# Patient Record
Sex: MALE | Race: WHITE | NOT HISPANIC OR LATINO
[De-identification: names, ages, dates, MRNs, and addresses within clinical notes are randomized per-mention and may not be internally consistent; named-entity substitution may affect disease eponyms.]

---

## 2018-12-28 ENCOUNTER — RECORD ABSTRACTING (OUTPATIENT)
Age: 83
End: 2018-12-28

## 2018-12-28 DIAGNOSIS — Z82.3 FAMILY HISTORY OF STROKE: ICD-10-CM

## 2019-01-11 ENCOUNTER — RX RENEWAL (OUTPATIENT)
Age: 84
End: 2019-01-11

## 2019-01-14 ENCOUNTER — RX RENEWAL (OUTPATIENT)
Age: 84
End: 2019-01-14

## 2019-01-25 ENCOUNTER — APPOINTMENT (OUTPATIENT)
Dept: UROLOGY | Facility: CLINIC | Age: 84
End: 2019-01-25
Payer: MEDICARE

## 2019-01-25 VITALS
SYSTOLIC BLOOD PRESSURE: 154 MMHG | HEIGHT: 71 IN | HEART RATE: 58 BPM | DIASTOLIC BLOOD PRESSURE: 62 MMHG | WEIGHT: 196 LBS | BODY MASS INDEX: 27.44 KG/M2

## 2019-01-25 LAB
BACTERIA: 0
BILIRUB UR QL STRIP: NORMAL
CASTS: 0
CLARITY UR: CLEAR
COLLECTION METHOD: NORMAL
CRYSTALS: 0
DATE COLLECTED: NORMAL
EPITHELIAL CELLS: NORMAL
GLUCOSE UR-MCNC: NORMAL
HCG UR QL: NORMAL EU/DL
HEMOCCULT SP1 STL QL: NEGATIVE
HGB UR QL STRIP.AUTO: NORMAL
KETONES UR-MCNC: NORMAL
LEUKOCYTE ESTERASE UR QL STRIP: NORMAL
MUCUS: 0
NITRITE UR QL STRIP: NORMAL
PH UR STRIP: 5
PROT UR STRIP-MCNC: NORMAL
QUALITY CONTROL: YES
RBC CASTS # UR COMP ASSIST: 0
SP GR UR STRIP: 1.01
WBC: NORMAL

## 2019-01-25 PROCEDURE — 52000 CYSTOURETHROSCOPY: CPT

## 2019-01-25 PROCEDURE — 81000 URINALYSIS NONAUTO W/SCOPE: CPT

## 2019-01-25 PROCEDURE — 99213 OFFICE O/P EST LOW 20 MIN: CPT | Mod: 25

## 2019-01-25 PROCEDURE — 82270 OCCULT BLOOD FECES: CPT

## 2019-01-25 RX ORDER — CIPROFLOXACIN HYDROCHLORIDE 500 MG/1
500 TABLET, FILM COATED ORAL
Qty: 3 | Refills: 3 | Status: DISCONTINUED | COMMUNITY
Start: 2019-01-14 | End: 2019-01-25

## 2019-01-25 RX ORDER — CIPROFLOXACIN HYDROCHLORIDE 500 MG/1
500 TABLET, FILM COATED ORAL
Qty: 3 | Refills: 3 | Status: DISCONTINUED | COMMUNITY
Start: 2019-01-11 | End: 2019-01-25

## 2019-01-25 RX ORDER — LABETALOL HYDROCHLORIDE 200 MG/1
200 TABLET, FILM COATED ORAL
Refills: 0 | Status: DISCONTINUED | COMMUNITY
End: 2019-01-25

## 2019-01-25 NOTE — CHIEF COMPLAINT
[FreeTextEntry1] : 1.  BLADDER CANCER:  Here for surveillance cystoscopy\par 2.  PROSTATE CANCER:  6.6 years S/P R*T; here for PSA blood testing and examination

## 2019-01-25 NOTE — PROCEDURE
[Cystoscopy] : cystoscopy [Patient] : the patient [Pt took necessary Preparations and Antibiotics for Procedure] : pt took necessary preparations and antibiotics for procedure [None] : none [Ciprofloxacin] : Ciprofloxacin [Supine] : supine [Betadine] : with betadine [Intraurethral 2% Lidocaine Gel ___ (cc)] : [unfilled] cc of 2% Lidocaine Gel was administered intraurethrally  [Flexible Cystoscope] : A flexible cystoscope was used to visualize the urethra and bladder. [Normal] : was normal [Length ___ cm] : was estimated to be [unfilled] cm in length [Trilobar Hypertrophy] : had an enlargement of the lateral and median prostatic lobes [Other ___] : [unfilled] [] : had clear efflux of urine bilaterally [No Bladder Tumor] : No bladder tumor visualized [3] : a grade 3 [Multiple] : diverticula were present in the bladder wall [Instrumented] : an instrumented [Cytology] : cytology [FISH] : FISH [No Complications] : There were no complications [Tolerated Well] : the patient tolerated the procedure well [Ciprofloxacin] : Ciprofloxacin [unfilled] [Post procedure instructions and information given] : post procedure instructions and information given and reviewed with patient. [de-identified] : The bladder appeared 2-3+ trabeculated with small cellules and diverticuli present, mostly on the floor. There is no evidence of suspicious mucosal thickening, suspicious erythematous patches, or any obvious neoplasm, foreign body or stone. [de-identified] : After the examination was completed the patient voided 184 cc the complex uroflow machine with an excellent stream.\par \par Maximum flow: 18 cc per second (+42% of the median deviation)\par \par Outflow: 8 cc per second (-1% below the median deviation)\par \par Voiding time: 21 seconds (-2% below the median deviation)\par \par The patient emptied his bladder completely.\par Cystoscopy today appears benign.Surveillance in 6 months as planned.

## 2019-01-25 NOTE — REVIEW OF SYSTEMS
[Loss Of Hearing] : hearing loss [Joint Pain] : joint pain [Negative] : Heme/Lymph [Chest Pain] : no chest pain [Palpitations] : no palpitations [Shortness Of Breath] : no shortness of breath [Cough] : no cough [Orthopnea] : no orthopnea [Wheezing] : no wheezing [PND] : no PND [Abdominal Pain] : no abdominal pain [Vomiting] : no vomiting [Constipation] : no constipation [Diarrhea] : no diarrhea [Heartburn] : no heartburn [Melena] : no melena

## 2019-01-25 NOTE — PHYSICAL EXAM
[General Appearance - Well Nourished] : well nourished [Normal Appearance] : normal appearance [Well Groomed] : well groomed [Bowel Sounds] : normal bowel sounds [Abdomen Soft] : soft [Abdomen Tenderness] : non-tender [Abdomen Mass (___ Cm)] : no abdominal mass palpated [Abdomen Hernia] : no hernia was discovered [Costovertebral Angle Tenderness] : no ~M costovertebral angle tenderness [Size (2+)] : size was 2+ [Rectal Exam - Seminal Vesicles] : was normal [Nl Sphincter Tone] : normal sphincter tone [Nl Perineum] : perineum was normal on inspection [No Lesions] : no lesions [Circumcised] : the penis was circumcised [Normal] : normal [Testicular Atrophy On The Right] : atrophic [Epididymis] : was normal [Skin Color & Pigmentation] : normal skin color and pigmentation [5th Left ICS - MCL] : palpated at the 5th LICS in the midclavicular line [Normal Rate] : normal [Normal S1] : normal S1 [Normal S2] : normal S2 [No Murmur] : no murmurs heard [No Pitting Edema] : no pitting edema present [] : no respiratory distress [Exaggerated Use Of Accessory Muscles For Inspiration] : no accessory muscle use [Auscultation Breath Sounds / Voice Sounds] : lungs were clear to auscultation bilaterally [Chest Palpation] : palpation of the chest revealed no abnormalities [Lungs Percussion] : the lungs were normal to percussion [Oriented To Time, Place, And Person] : oriented to person, place, and time [Affect] : the affect was normal [Mood] : the mood was normal [Not Anxious] : not anxious [Normal Station and Gait] : the gait and station were normal for the patient's age [No Focal Deficits] : no focal deficits [No Palpable Adenopathy] : no palpable adenopathy [FreeTextEntry1] : ecstatic about absence of severe chronic back pain since surgery [Prostate Hard Area Or Nodule Bilaterally] : had no palpable nodules [Rectal Exam - Prostate] : was not indurated [Prostate Tenderness] : was not tender [Prostate Fluctuant] : was not fluctuant [Occult Blood Positive] : exam was negative for occult blood [Rectal Tenderness] : no tenderness [Mass___cm] : no rectal masses [Testicular Atrophy On The Left] : not atrophic [S3] : no S3 [S4] : no S4

## 2019-01-30 LAB
ANION GAP SERPL CALC-SCNC: 18 MMOL/L
BUN SERPL-MCNC: 47 MG/DL
CALCIUM SERPL-MCNC: 9.4 MG/DL
CHLORIDE SERPL-SCNC: 111 MMOL/L
CO2 SERPL-SCNC: 13 MMOL/L
CREAT SERPL-MCNC: 2.11 MG/DL
GLUCOSE SERPL-MCNC: 85 MG/DL
HLX UV FISH FINAL REPORT: NORMAL
POTASSIUM SERPL-SCNC: 5.3 MMOL/L
PSA FREE FLD-MCNC: 22 %
PSA FREE SERPL-MCNC: 0.04 NG/ML
PSA SERPL-MCNC: 0.18 NG/ML
SODIUM SERPL-SCNC: 142 MMOL/L
URINE CYTOLOGY: NORMAL

## 2019-07-19 ENCOUNTER — APPOINTMENT (OUTPATIENT)
Dept: UROLOGY | Facility: CLINIC | Age: 84
End: 2019-07-19
Payer: MEDICARE

## 2019-07-19 VITALS
SYSTOLIC BLOOD PRESSURE: 175 MMHG | DIASTOLIC BLOOD PRESSURE: 81 MMHG | WEIGHT: 192 LBS | HEIGHT: 71 IN | HEART RATE: 58 BPM | BODY MASS INDEX: 26.88 KG/M2

## 2019-07-19 LAB
BACTERIA: NORMAL
BILIRUB UR QL STRIP: NORMAL
CASTS: 0
CLARITY UR: NORMAL
COLLECTION METHOD: NORMAL
CRYSTALS: 0
EPITHELIAL CELLS: 0
GLUCOSE UR-MCNC: NORMAL
HCG UR QL: 0.2 EU/DL
HGB UR QL STRIP.AUTO: NORMAL
KETONES UR-MCNC: NORMAL
LEUKOCYTE ESTERASE UR QL STRIP: NORMAL
MUCUS: 0
NITRITE UR QL STRIP: NORMAL
PH UR STRIP: 5.5
PROT UR STRIP-MCNC: NORMAL
RBC CASTS # UR COMP ASSIST: NORMAL
SP GR UR STRIP: 1.01
WBC: NORMAL

## 2019-07-19 PROCEDURE — 51741 ELECTRO-UROFLOWMETRY FIRST: CPT

## 2019-07-19 PROCEDURE — 52000 CYSTOURETHROSCOPY: CPT

## 2019-07-19 PROCEDURE — 81000 URINALYSIS NONAUTO W/SCOPE: CPT

## 2019-07-19 PROCEDURE — 99213 OFFICE O/P EST LOW 20 MIN: CPT | Mod: 25

## 2019-07-19 NOTE — CHIEF COMPLAINT
[FreeTextEntry1] : 1. BLADDER CANCER: Here for 6 monthly surveillance cystoscopy\par 2. PROSTATE CANCER: 7 years S/P R*T; here for PSA blood testing and examination

## 2019-07-19 NOTE — HISTORY OF PRESENT ILLNESS
[FreeTextEntry1] : (Last seen 01/25/19)\par  .\par  Laurent Phillips is an 86-year-old gentleman with a history of high-grade noninvasive bladder cancer discovered at the time of workup for prostate cancer. The bladder cancer was treated on 02/08/12. Mr. Phillips's last recurrence was documented on 06/29/15. The patient did undergo extensive evaluation in October 2017. when he presented with an episode of gross hematuria. While there was no evidence of visible tumor and cytology studies were benign FISH proved to be positive. Mr. Phillips is currently on a q6 monthly surveillance regimen. He comes to the office today for routine cystoscopy. \par \par Cystoscopy was last performed on 01/25/19. That study and both cytology and FISH proved to be benign.  The patient's last PSA from the same date was excellent (PSA 0.18;free PSA 22%) indicating a continued excellent response to R*T for prostate cancer 7 years since treatment ended.\par \par He remains under treatment/surveillance for bilateral lung cancer.  Blood was dr PSA by the [facility] physician (Dr. Guadarrama) on 03/11/19. The PSA was found to be 0.16 NG/ML.\par \par While Mr. Phillips denied having any problems his urine was found to purulent on evaluation today.  Questioning confirmed a 3 day history of  increased urinary frequency (now voiding 6-8 x/day, up from 4 x/day) but without other irritative voiding symptoms.\par \par \par  .\par  PERTINENT UROLOGIC HISTORY:\par  .\par  1/12/12: PROSTATE BIOPSY; 10/10 biopsies on the left 2/6 biopsies on the right positive for [place] 6 adenocarcinoma\par  02/08/12: TURBT(III/III) (noninvasive)\par  05/10/12: RADIATION THERAPY ends\par  01/09/12: CYSTOSCOPY WITH BIOPSY (+); focus of carcinoma in situ completely treated in the office\par  06/25/13: CYSTOSCOPY WITH BIOPSY (+); (I/III low-grade tumor completely treated in the office)\par  06/29/15: CYSTOSCOPY WITH BIOPSY (+): Small patch of carcinoma in situ completely treated in the office\par  07/17/15: CYSTOSCOPY WITH MULTIPLE RANDOM BIOPSIES(-); all biopsies negative, cytology (-); FISH (+)\par  11/06/15: CYSTOSCOPY (-)\par  04/19/16: Cystoscopy(-); cytology negative for high-grade cancer; FISH (-)\par  06/28/16: PSA 0.4 (drawn by Dr. Guadarrama)\par  01/23/17: PSA 0.30; free PSA 16%\par  01/23/17: Cystoscopy (-); Cytology positive for cellular elements with reactive changes consistent with instrumentation but also positive for a "Few cells with significant atypia (nuclear irregularity) and degenerative changes, suggesting the possibility of a urothelial neoplasm" ; FISH (+) \par  06/12/17: Cystoscopy (-); cytology (-)\par  06/12/17: PSA 0.23\par  06/16/17: CT STONE STUDY (-)\par  10/23/17: Cystoscopy (-); cytology (-); FISH (-)\par  12/13/17: CYSTOSCOPY WITH BILATERAL RETROGRADE PYELOGRAPHY (-); cytology (-); FISH (+)\par  12/19/17: PSA 0.26; free PSA of 19%\par 06/29/18: PSA 0.28; free PSA 22%\par 01/25/19:  Cystoscopy (-); cytology (-); FISH (-)\par 01/25/19:  PSA 0.18;free PSA 22%;  BUN 47; creatinine 2.11\par \par 03/11/19: PSA 0.16\par \par CURRENT UROLOGIC REVIEW OF SYSTEMS:\par \par Incontinence Assessed?. YES\par \par Frequency: (+) now 6-8 x/day, up from 4 x/day \par Nocturia: (-) 1 x/night\par Dysuria: (-)\par Urgency: (-) \par Hesitancy: (-)\par Intermittency: (-)\par Sensation of Incomplete Voiding : (-)\par Double voiding : (-)\par Stress incontinence: (-)\par Urge incontinence: (-)\par Use of absorbent pads: (-)\par Terminal dribbling: (-)\par Status of stream. "Good"\par Venereal disease: (-)\par Kidney stones: (-)\par UTIs: (-)\par Prior urologic surgery: (+) (see HPI)\par Hematuria: (-)\par Abdominal pressure: (-)\par Back pain: (-) S/P back surgery non-urologic back pain is present but much less than that encountered prior to back surgery in December 2018\par Sexual Status. not active\par Gout: (-)\par Renal problems: (+) 01/25/19  BUN 47; creatinine 2.11\par Family History of Urologic Problems: (-)\par International Prostate Symptom Score (IPSS): 4 (Mild 0-7)\par Satisfaction Index: 1 (pleased)

## 2019-07-19 NOTE — PROCEDURE
[Cystoscopy] : cystoscopy [Other ___] : [unfilled] [Patient] : the patient [Consent Obtained] : written consent was obtained prior to the procedure and is detailed in the patient's record [Allergies Reviewed] : Allergies reviewed [Pt took necessary Preparations and Antibiotics for Procedure] : pt took necessary preparations and antibiotics for procedure [None] : none [Ciprofloxacin] : Ciprofloxacin [Supine] : supine [Intraurethral 2% Lidocaine Gel ___ (cc)] : [unfilled] cc of 2% Lidocaine Gel was administered intraurethrally  [Flexible Cystoscope] : A flexible cystoscope was used to visualize the urethra and bladder. [Instrumented] : an instrumented [Cytology] : cytology [FISH] : FISH [No Complications] : There were no complications [Tolerated Well] : the patient tolerated the procedure well [Ciprofloxacin] : Ciprofloxacin [unfilled] [Post procedure instructions and information given] : post procedure instructions and information given and reviewed with patient. [de-identified] : With the patient supine and after appropriate prep with Betadine solution a flexible cystoscope was passed per urethra under direct vision.\par \par URETHRA:\par \par The pendulous urethra was grossly normal up to the junction of the pendulous and bulbar urethra were false passage was identified in the 5:00 position. A false passageway was well epithelialized no obvious abnormality noted within. The bulbar urethra was normal. The sphincter was normal both in appearance and function.\par \par PROSTATE:\par \par The prostate measured approximately 3-3.5 cm from bladder neck to verumontanum. Trilobar hyperplasia was present and mildly visually obstructing. The bladder neck however appeared to be open. No discharge was expressed the cystoscope traversed the prostatic urethra.\par \par BLADDER:\par \par The bladder appeared to be 3+ trabeculated with numerous cellules and small diverticuli present. The bladder was also filled with a large-volume of proteinaceous debris and cloudy urine.  The urine was noted to be nitrite positive on dip sticks with greater than 100 wbc's and 4+ bacteria present the microscopy. The volume of urine within the bladder was minimal. The bladder was then copiously irrigated free of all debris. The mucosa was noted to be edematous and inflamed consistent with a urinary tract infection (despite the patient's claims only minimal symptoms). While urine culture was sent it is noted the patient had taken Cipro tablet 45 minutes before providing the specimen. After the bladder was copiously irrigated and all debris removed the mucosa was carefully inspected. Ureteral orifices were difficult to identify due to the edema and erythema of the bladder floor, though both otherwise appeared to be normal.Other than edema and erythema from the infection there was no suggestion of malignant neoplasm. Bladder washings were then collected for cytology and FISH.\par \par NOTE:\par \par After the procedure was completed the proximal innominate cc were left within the bladder. The patient was able to empty his bladder completely with a powerful stream:\par \par Maximum flow: 16 cc per second (+100% above the median deviation)\par \par Average flow: 6 cc per second (+42% above the median deviation)\par \par Voiding time: 16 seconds (+3% above the median deviation)\par \par Impression:\par \par No evidence of recurrent carcinoma. Minimally symptomatic urinary tract infection to be treated with a reduced dose of Cipro (due to stage III CRF)\par \par Plan:\par \par Cipro 250 mg b.i.d. for 10 days\par \par Followup in 2 weeks to repeat UA\par \par Repeat cystoscopy in 3 months\par \par

## 2019-07-19 NOTE — ASSESSMENT
[FreeTextEntry1] : Amilcar Phillips is an 86-year-old gentleman with a history of low-grade bladder cancer for which cystoscopy was performed today as part of his routine surveillance. The patient was found to have an acute urinary tract infection with minor symptoms of 3 day duration. Cystoscopy was performed and the bladder copiously irrigated removing all purulent matter. Mr. Phillips had already taken a Cipro tablet. UA was purulent and a culture was sent despite the patient's having already taken an antibiotic. A 10 day course of Cipro (250 mg b.i.d. for 10 days) has been prescribed. Appropriate warnings regarding the patient's history of renal dysfunction and the risks regarding Achilles tendonitis were given. Followup in 2 weeks is planned to assure appropriate response to treatment.\par \par There was no evidence of bladder cancer. Assuming cytology and FISH are benign cystoscopy in 3 months is planned.\par \par It was appreciated that the patient's most recent PSA from 03/11/19 was 0.16. Blood testing was not repeated today. His echo exam remains benign and the patient has no evidence of clinically significant prostate cancer.

## 2019-07-19 NOTE — LETTER BODY
[Dear  ___] : Dear  [unfilled], [Courtesy Letter:] : I had the pleasure of seeing your patient, [unfilled], in my office today. [Please see my note below.] : Please see my note below. [Sincerely,] : Sincerely, [FreeTextEntry3] : RAMONA Adair M.D.\par

## 2019-07-21 LAB — BACTERIA UR CULT: NORMAL

## 2019-07-22 LAB — URINE CYTOLOGY: NORMAL

## 2019-07-26 LAB — HLX UV FISH FINAL REPORT: NORMAL

## 2019-10-29 ENCOUNTER — APPOINTMENT (OUTPATIENT)
Dept: UROLOGY | Facility: CLINIC | Age: 84
End: 2019-10-29

## 2019-12-06 ENCOUNTER — APPOINTMENT (OUTPATIENT)
Dept: PULMONOLOGY | Facility: CLINIC | Age: 84
End: 2019-12-06
Payer: MEDICARE

## 2019-12-06 VITALS
OXYGEN SATURATION: 96 % | SYSTOLIC BLOOD PRESSURE: 154 MMHG | HEIGHT: 71 IN | DIASTOLIC BLOOD PRESSURE: 86 MMHG | HEART RATE: 58 BPM | BODY MASS INDEX: 27.02 KG/M2 | WEIGHT: 193 LBS

## 2019-12-06 PROCEDURE — 99205 OFFICE O/P NEW HI 60 MIN: CPT | Mod: 25

## 2019-12-06 PROCEDURE — 94010 BREATHING CAPACITY TEST: CPT

## 2019-12-06 NOTE — HISTORY OF PRESENT ILLNESS
[Cough] : denies coughing [Difficulty Breathing During Exertion] : stable dyspnea on exertion [Feelings Of Weakness On Exertion] : stable exercise intolerance [Wheezing] : denies wheezing [Fever] : denies fever [Regional Soft Tissue Swelling Both Lower Extremities] : denies lower extremity edema [Chest Pain Or Discomfort] : denies chest pain [Former] : is a former smoker [0.5  -  Very, very slight] : 0.5, very, very slight [Class II - Mild Symptoms and Slight Limitations] : II [___ Pack Year History] : [unfilled] pack year history [___ Year Quit] : ~He/She~ quit smoking in [unfilled] [Daytime Somnolence] : daytime somnolence [Frequent Nocturnal Awakening] : frequent nocturnal awakening [Awakening With Dry Mouth] : awakening with dry mouth [ESS: ___] : ESS score [unfilled] [Recent  Weight Gain] : recent weight gain [Wt Loss ___ Lbs] : no recent weight loss [Wt Gain ___ Lbs] : no recent weight gain [FreeTextEntry1] : I was asked to consult on this patient by Dr. 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 He describes occasional shortness of breath or working in the garden he usually does not cough unless he is sick denies chest pain pressure or tightness. He does not snore to the best of his knowledge but he wears hearing and has nocturia once per night. Occasional daytime sleepiness and has not had PFTs since prior to his surgery in October 2012 [de-identified] : 1PPD x 25 years ago

## 2019-12-06 NOTE — PHYSICAL EXAM
[General Appearance - In No Acute Distress] : no acute distress [Low Lying Soft Palate] : low lying soft palate [Elongated Uvula] : elongated uvula [Enlarged Base of the Tongue] : enlargement of the base of the tongue [Erythema] : erythema of the pharynx [Heart Rate And Rhythm] : heart rate and rhythm were normal [Neck Appearance] : the appearance of the neck was normal [Heart Sounds] : normal S1 and S2 [Bowel Sounds] : normal bowel sounds [FreeTextEntry1] : globally dec BS w/ diffuse exp wheeze [] : no respiratory distress [Respiration, Rhythm And Depth] : normal respiratory rhythm and effort [Cyanosis, Localized] : no localized cyanosis [Abnormal Walk] : normal gait [Nail Clubbing] : no clubbing of the fingernails [Cranial Nerves] : cranial nerves 2-12 were intact [Skin Color & Pigmentation] : normal skin color and pigmentation [FreeTextEntry2] : no edema [Oriented To Time, Place, And Person] : oriented to person, place, and time [No Focal Deficits] : no focal deficits [Memory Recent] : recent memory was not impaired

## 2019-12-06 NOTE — DISCUSSION/SUMMARY
[FreeTextEntry1] : today FEV1- 2.13 (78%) FEV1/FVC- 61\par Chest CT 10/2018 + New 3.5 RUL GG nodule + inc int markings +mild emphysema

## 2019-12-06 NOTE — REVIEW OF SYSTEMS
[Fever] : no fever [Recent Wt Loss (___ Lbs)] : no recent weight loss [Fatigue] : fatigue [Recent Wt Gain (___ Lbs)] : no recent weight gain [Postnasal Drip] : no postnasal drip [Dry Mouth] : no dry mouth [Nasal Congestion] : nasal congestion [Sputum] : not coughing up ~M sputum [Cough] : cough [Dyspnea] : dyspnea [Nocturia] : nocturia [Nasal Discharge] : nasal discharge [As Noted in HPI] : as noted in HPI [Negative] : Endocrine

## 2019-12-12 ENCOUNTER — RESULT REVIEW (OUTPATIENT)
Age: 84
End: 2019-12-12

## 2019-12-13 ENCOUNTER — CLINICAL ADVICE (OUTPATIENT)
Age: 84
End: 2019-12-13

## 2020-01-21 ENCOUNTER — APPOINTMENT (OUTPATIENT)
Dept: NEPHROLOGY | Facility: CLINIC | Age: 85
End: 2020-01-21

## 2020-02-03 ENCOUNTER — RESULT REVIEW (OUTPATIENT)
Age: 85
End: 2020-02-03

## 2020-02-18 ENCOUNTER — APPOINTMENT (OUTPATIENT)
Dept: PULMONOLOGY | Facility: CLINIC | Age: 85
End: 2020-02-18
Payer: MEDICARE

## 2020-02-18 VITALS
BODY MASS INDEX: 27.02 KG/M2 | SYSTOLIC BLOOD PRESSURE: 158 MMHG | HEIGHT: 71 IN | WEIGHT: 193 LBS | OXYGEN SATURATION: 96 % | HEART RATE: 67 BPM | DIASTOLIC BLOOD PRESSURE: 80 MMHG

## 2020-02-18 PROCEDURE — 99214 OFFICE O/P EST MOD 30 MIN: CPT

## 2020-02-18 NOTE — HISTORY OF PRESENT ILLNESS
[Former] : former [Never] : never [TextBox_4] : He is here to f/u on his PET scan. He remains asymptomatic and eager to get done whatever he has to as long as he doesn’t Jeopardize his Grandsons B day party in 4 days. He has a copy of the report but didn’t understand the significance of the chest component.\par CAT=2 [TextBox_11] : 1 [TextBox_13] : 25 [TextBox_15] : 1972 [TextBox_29] : Heavy asbestos exposure Lives alone, retired   Never smoked/drinks liquor occasionally

## 2020-02-18 NOTE — PHYSICAL EXAM
[Normal Oropharynx] : normal oropharynx [No Acute Distress] : no acute distress [No Neck Mass] : no neck mass [Normal Rate/Rhythm] : normal rate/rhythm [Normal Appearance] : normal appearance [Normal S1, S2] : normal s1, s2 [No Resp Distress] : no resp distress [Benign] : benign [Normal Gait] : normal gait [No Abnormalities] : no abnormalities [No Cyanosis] : no cyanosis [No Clubbing] : no clubbing [No Edema] : no edema [FROM] : FROM [No Focal Deficits] : no focal deficits [Oriented x3] : oriented x3 [Normal Affect] : normal affect [TextBox_68] : globally dec BS

## 2020-02-18 NOTE — DISCUSSION/SUMMARY
[FreeTextEntry1] : PET CT 2/13/2020= RUL 1.7cm SUV=5.7-->7.7\par PFT 12/27/2019 FEV1 2.44 (87% predicted)FEV1/FVC 65 postBD FEV1 94% (+8% change) TLC 73% RV 25% RV/TLC 33 DLCO 42%

## 2020-02-20 ENCOUNTER — RESULT REVIEW (OUTPATIENT)
Age: 85
End: 2020-02-20

## 2020-03-02 ENCOUNTER — APPOINTMENT (OUTPATIENT)
Dept: THORACIC SURGERY | Facility: CLINIC | Age: 85
End: 2020-03-02
Payer: MEDICARE

## 2020-03-02 PROCEDURE — 99205 OFFICE O/P NEW HI 60 MIN: CPT

## 2020-03-03 VITALS
DIASTOLIC BLOOD PRESSURE: 86 MMHG | BODY MASS INDEX: 27.3 KG/M2 | SYSTOLIC BLOOD PRESSURE: 148 MMHG | OXYGEN SATURATION: 96 % | WEIGHT: 195 LBS | HEART RATE: 72 BPM | HEIGHT: 71 IN

## 2020-03-03 NOTE — PHYSICAL EXAM
[General Appearance - Alert] : alert [General Appearance - In No Acute Distress] : in no acute distress [Sclera] : the sclera and conjunctiva were normal [PERRL With Normal Accommodation] : pupils were equal in size, round, and reactive to light [Extraocular Movements] : extraocular movements were intact [Outer Ear] : the ears and nose were normal in appearance [Oropharynx] : the oropharynx was normal [Neck Appearance] : the appearance of the neck was normal [Neck Cervical Mass (___cm)] : no neck mass was observed [Jugular Venous Distention Increased] : there was no jugular-venous distention [Thyroid Diffuse Enlargement] : the thyroid was not enlarged [Thyroid Nodule] : there were no palpable thyroid nodules [Auscultation Breath Sounds / Voice Sounds] : lungs were clear to auscultation bilaterally [Heart Rate And Rhythm] : heart rate was normal and rhythm regular [Heart Sounds Gallop] : no gallops [Heart Sounds] : normal S1 and S2 [Murmurs] : no murmurs [Heart Sounds Pericardial Friction Rub] : no pericardial rub [Examination Of The Chest] : the chest was normal in appearance [Chest Visual Inspection Thoracic Asymmetry] : no chest asymmetry [Diminished Respiratory Excursion] : normal chest expansion [Abdomen Soft] : soft [Bowel Sounds] : normal bowel sounds [Abdomen Tenderness] : non-tender [Abdomen Mass (___ Cm)] : no abdominal mass palpated [Cervical Lymph Nodes Enlarged Posterior Bilaterally] : posterior cervical [Cervical Lymph Nodes Enlarged Anterior Bilaterally] : anterior cervical [Supraclavicular Lymph Nodes Enlarged Bilaterally] : supraclavicular [Axillary Lymph Nodes Enlarged Bilaterally] : axillary [Inguinal Lymph Nodes Enlarged Bilaterally] : inguinal [Femoral Lymph Nodes Enlarged Bilaterally] : femoral [No CVA Tenderness] : no ~M costovertebral angle tenderness [No Spinal Tenderness] : no spinal tenderness [Skin Color & Pigmentation] : normal skin color and pigmentation [] : no rash [Skin Turgor] : normal skin turgor [Deep Tendon Reflexes (DTR)] : deep tendon reflexes were 2+ and symmetric [Sensation] : the sensory exam was normal to light touch and pinprick [No Focal Deficits] : no focal deficits [Oriented To Time, Place, And Person] : oriented to person, place, and time [Affect] : the affect was normal [Impaired Insight] : insight and judgment were intact

## 2020-03-04 ENCOUNTER — RESULT REVIEW (OUTPATIENT)
Age: 85
End: 2020-03-04

## 2020-03-05 ENCOUNTER — RESULT REVIEW (OUTPATIENT)
Age: 85
End: 2020-03-05

## 2020-03-05 NOTE — ASSESSMENT
[FreeTextEntry1] : 85 y/o M with history of bladder ca and lung ca s/p lobectomy in 2016 now found to have 2 hypermetabolic RUL lung nodules.  These lung nodules are concerning for primary lung cancer with differential dx to include metastasis from bladder ca.  These concerns have been discussed with patient.  He has been instructed to undergo CT guided needle biopsy in order to obtain pathologic diagnosis.  Patient will be reevaluated after pathology determined.  He will follow up in our office after biopsy.

## 2020-03-05 NOTE — HISTORY OF PRESENT ILLNESS
[FreeTextEntry1] : 87 y/o M former smoker with asbostos exposure and pmhx of lung ca (s/p SHONNA resection in October 2012 by Dr. Bolton) who was referred to our office after PET demonstrated multiple hypermetabolic RUL lung nodules.  \par \par Patient initially underwent CT chest for screening demonstrated multiple pulmonary nodules.  HE was subsequently referred for PET which showed nodules to be hypermetabolic.\par \par Patient admits to SOB & some wheezing but denies weakness, fever, weight loss, hemotpysis.

## 2020-03-05 NOTE — REVIEW OF SYSTEMS
[As Noted in HPI] : as noted in HPI [Shortness Of Breath] : shortness of breath [Arthralgias] : arthralgias [Joint Pain] : joint pain [Negative] : Heme/Lymph [FreeTextEntry9] : ambulates with cane

## 2020-03-05 NOTE — DATA REVIEWED
[FreeTextEntry1] : CT Chest:  2/3/2020: Postsurgical changes, including a suture line and an extensive plaque like density are again appreciated in the left upper lobe. A 1.3 cm mass laterally at the right apex (9/3) is unchanged. A A 1.7 cm bilobed mass posteriorly within the right upper lobe (22/3) is again noted and unchanged. A 1.1 cm subpleural nodule anteriorly and laterally within the right lower lobe (42/3) is unchanged. A 4 mm nodule laterally within the right middle lobe (38/3) is unchanged. A 7 mm right major fissural nodule (29/3) is unchanged. A small grouping of body and tree densities is again noted in the right middle lobe. A 5 mm subpleural nodule within the lingula segment (41/3) is unchanged There is normal arborization of the tracheobronchial tree. No pleural or pericardial effusion is identified. No pathologic mediastinal lymphadenopathy is noted. Small mediastinal lymph nodes are again appreciated and are unchanged. The hilar regions cannot adequately be assessed for adenopathy without intravenous contrast, however, no bulky adenopathy is noted. The heart appears mildly enlarged. There are coronary artery calcifications. 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 There is mild aneurysmal dilatation of the abdominal aorta to approximately 3.2 cm.\par Bony survey fails to demonstrate a discrete, focal, suspicious lytic or blastic lesion, however, evaluation for bony metastases is better achieved with bone scintigraphy. There are degenerative changes of the spine.\par IMPRESSION: No significant change in in several small pulmonary masses and pulmonary nodules. These remain suspicious for primary neoplasm and/or metastatic disease.\par \par PET 2/13/2020:  Multiple FDG avid pulmonary nodules in the right lung.  The right apical nodule measures approximately 1.2 cm with max SUV of 2.1 early 2.9 delayed.  The 1.7 cm nodule in the posterior RUL has a max SUV of 5.7 early 7.7 delayed.  There are additional minimally avid subcentimeter nodules in the right lung.  There is a SHONNA nodule that is non FDG avid.  No lymphadenopathy within the thorax.

## 2020-03-05 NOTE — CONSULT LETTER
[Dear  ___] : Dear  [unfilled], [( Thank you for referring [unfilled] for consultation for _____ )] : Thank you for referring [unfilled] for consultation for [unfilled] [Please see my note below.] : Please see my note below. [Consult Closing:] : Thank you very much for allowing me to participate in the care of this patient.  If you have any questions, please do not hesitate to contact me. [Sincerely,] : Sincerely, [FreeTextEntry3] : Luigi Vu MD\par Thoracic Surgery\par [facility]

## 2020-03-16 ENCOUNTER — APPOINTMENT (OUTPATIENT)
Dept: THORACIC SURGERY | Facility: CLINIC | Age: 85
End: 2020-03-16
Payer: MEDICARE

## 2020-03-16 PROCEDURE — 99215 OFFICE O/P EST HI 40 MIN: CPT

## 2020-03-16 NOTE — PHYSICAL EXAM
[Sclera] : the sclera and conjunctiva were normal [PERRL With Normal Accommodation] : pupils were equal in size, round, and reactive to light [Extraocular Movements] : extraocular movements were intact [Neck Appearance] : the appearance of the neck was normal [Neck Cervical Mass (___cm)] : no neck mass was observed [Jugular Venous Distention Increased] : there was no jugular-venous distention [Thyroid Diffuse Enlargement] : the thyroid was not enlarged [Thyroid Nodule] : there were no palpable thyroid nodules [Auscultation Breath Sounds / Voice Sounds] : lungs were clear to auscultation bilaterally [Heart Rate And Rhythm] : heart rate was normal and rhythm regular [Heart Sounds] : normal S1 and S2 [Heart Sounds Gallop] : no gallops [Murmurs] : no murmurs [Heart Sounds Pericardial Friction Rub] : no pericardial rub [Examination Of The Chest] : the chest was normal in appearance [Chest Visual Inspection Thoracic Asymmetry] : no chest asymmetry [Diminished Respiratory Excursion] : normal chest expansion [Bowel Sounds] : normal bowel sounds [Abdomen Soft] : soft [Abdomen Tenderness] : non-tender [Abdomen Mass (___ Cm)] : no abdominal mass palpated [Cervical Lymph Nodes Enlarged Posterior Bilaterally] : posterior cervical [Cervical Lymph Nodes Enlarged Anterior Bilaterally] : anterior cervical [Supraclavicular Lymph Nodes Enlarged Bilaterally] : supraclavicular [Axillary Lymph Nodes Enlarged Bilaterally] : axillary [Femoral Lymph Nodes Enlarged Bilaterally] : femoral [Inguinal Lymph Nodes Enlarged Bilaterally] : inguinal [No CVA Tenderness] : no ~M costovertebral angle tenderness [No Spinal Tenderness] : no spinal tenderness [Skin Color & Pigmentation] : normal skin color and pigmentation [Skin Turgor] : normal skin turgor [] : no rash [Deep Tendon Reflexes (DTR)] : deep tendon reflexes were 2+ and symmetric [Sensation] : the sensory exam was normal to light touch and pinprick [No Focal Deficits] : no focal deficits [Oriented To Time, Place, And Person] : oriented to person, place, and time [Impaired Insight] : insight and judgment were intact [Affect] : the affect was normal

## 2020-03-23 NOTE — HISTORY OF PRESENT ILLNESS
[FreeTextEntry1] : 87 y/o M former smoker with asbostos exposure and pmhx of lung ca (s/p SHONNA resection in October 2012 by Dr. 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 He was subsequently referred for PET which showed nodules to be hypermetabolic.  \par \par Patient admits to SOB & some wheezing but denies weakness, fever, weight loss, hemotpysis.

## 2020-03-23 NOTE — DATA REVIEWED
[FreeTextEntry1] : CT Chest: 2/3/2020: Postsurgical changes, including a suture line and an extensive plaque like density are again appreciated in the left upper lobe. A 1.3 cm mass laterally at the right apex (9/3) is unchanged. A A 1.7 cm bilobed mass posteriorly within the right upper lobe (22/3) is again noted and unchanged. A 1.1 cm subpleural nodule anteriorly and laterally within the right lower lobe (42/3) is unchanged. A 4 mm nodule laterally within the right middle lobe (38/3) is unchanged. A 7 mm right major fissural nodule (29/3) is unchanged. A small grouping of body and tree densities is again noted in the right middle lobe. A 5 mm subpleural nodule within the lingula segment (41/3) is unchanged There is normal arborization of the tracheobronchial tree. No pleural or pericardial effusion is identified. No pathologic mediastinal lymphadenopathy is noted. Small mediastinal lymph nodes are again appreciated and are unchanged. The hilar regions cannot adequately be assessed for adenopathy without intravenous contrast, however, no bulky adenopathy is noted. The heart appears mildly enlarged. There are coronary artery calcifications. 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 There is mild aneurysmal dilatation of the abdominal aorta to approximately 3.2 cm.\par Bony survey fails to demonstrate a discrete, focal, suspicious lytic or blastic lesion, however, evaluation for bony metastases is better achieved with bone scintigraphy. There are degenerative changes of the spine.\par IMPRESSION: No significant change in in several small pulmonary masses and pulmonary nodules. These remain suspicious for primary neoplasm and/or metastatic disease.\par \par PET 2/13/2020: Multiple FDG avid pulmonary nodules in the right lung. The right apical nodule measures approximately 1.2 cm with max SUV of 2.1 early 2.9 delayed. The 1.7 cm nodule in the posterior RUL has a max SUV of 5.7 early 7.7 delayed. There are additional minimally avid subcentimeter nodules in the right lung. There is a SHONNA nodule that is non FDG avid. No lymphadenopathy within the thorax. \par \par CT guided core needle biopsy: 3/5/2020 RUL lung nodule:  squamous cell carcinoma. \par \par 2012 Left upper lobe lung ca pathology: Invasive Adenocarcinoma\par \par PFTS 12/2019:  FVC 3.73 (97%)  FEV1 2.44 (87%) DLCO 14.42 (42%)

## 2020-03-23 NOTE — ASSESSMENT
[FreeTextEntry1] : 87 y/o M with newly diagnosed RUL squamous cell carcinoma as well as an additional suspicious RUL lung nodule  (however not biopsied).  Patient has good performance on PFTs and he is an appropriate candidate for robotic assisted RULobectomy with mediastinal lymph node biopsy.  Risks and benefits of the procedure have been discussed.  Patient expresses understanding and agrees to the plan. Prior to undergoing the procedure he will need cardiology clearance.  After obtaining clearance, he will be scheduled for surgery at [facility] at our earliest availability.

## 2020-03-23 NOTE — CONSULT LETTER
[Dear  ___] : Dear  [unfilled], [Consult Letter:] : I had the pleasure of evaluating your patient, [unfilled]. [( Thank you for referring [unfilled] for consultation for _____ )] : Thank you for referring [unfilled] for consultation for [unfilled] [Please see my note below.] : Please see my note below. [Consult Closing:] : Thank you very much for allowing me to participate in the care of this patient.  If you have any questions, please do not hesitate to contact me. [Sincerely,] : Sincerely, [FreeTextEntry3] : Luigi Vu MD\par Thoracic Surgery\par [facility]

## 2020-03-23 NOTE — REVIEW OF SYSTEMS
[As Noted in HPI] : as noted in HPI [Shortness Of Breath] : shortness of breath [Arthralgias] : arthralgias [Joint Pain] : joint pain [Joint Swelling] : joint swelling [Negative] : Heme/Lymph [FreeTextEntry9] : ambulates with cane

## 2020-03-26 ENCOUNTER — NON-APPOINTMENT (OUTPATIENT)
Age: 85
End: 2020-03-26

## 2020-03-26 ENCOUNTER — APPOINTMENT (OUTPATIENT)
Dept: CARDIOLOGY | Facility: CLINIC | Age: 85
End: 2020-03-26
Payer: MEDICARE

## 2020-03-26 VITALS
BODY MASS INDEX: 27.3 KG/M2 | WEIGHT: 195 LBS | SYSTOLIC BLOOD PRESSURE: 150 MMHG | HEIGHT: 71 IN | HEART RATE: 56 BPM | DIASTOLIC BLOOD PRESSURE: 70 MMHG

## 2020-03-26 PROCEDURE — 99204 OFFICE O/P NEW MOD 45 MIN: CPT

## 2020-03-26 PROCEDURE — 93000 ELECTROCARDIOGRAM COMPLETE: CPT

## 2020-03-26 NOTE — ASSESSMENT
[FreeTextEntry1] : 86-year-old male with hypertension hyperlipidemia\par PFTs reveal minimal obstruction with mild reversibility there was no air trapping although\par     the inspiratory capacity increased by 36% postbronchodilato\par Patient has no evidence of heart failure and no murmurs on physical exam\par Blood pressure well controlled on metoprolol\par Hyperlipidemia on statin\par Patient denies any chest pain or shortness of breath on exertion and due to having exercise tolerance greater than 4 METS patient has no cardiac contraindication for planned procedure\par Would recommend anesthesia evaluate patient for particular type of anesthetic as he has had erratic blood pressures in the past\par Son present during interview

## 2020-03-26 NOTE — HISTORY OF PRESENT ILLNESS
[FreeTextEntry1] : 86-year-old male with hypertension and hyperlipidemia on medications, history of exposure to asbestos with lung cancer going for CT surgery.  Patient had previous surgeries on bladder x2, left lung surgery and now is to undergo right lung lobectomy.\par Patient denies chest pain or shortness of breath on exertion and is able to walk up 2 flights of stairs with no shortness of breath approximately 6 weeks ago.  Patient has an ankle fracture approximately 4 weeks ago\par Patient has no evidence of heart failure on physical exam and no murmurs.  EKG reveals normal sinus rhythm right bundle branch block.\par Patient states that he stopped smoking 45 years ago\par Patient is intolerant to propofol due to erratic blood pressures\par

## 2020-03-26 NOTE — PHYSICAL EXAM
[General Appearance - Well Developed] : well developed [Normal Appearance] : normal appearance [Well Groomed] : well groomed [General Appearance - Well Nourished] : well nourished [No Deformities] : no deformities [General Appearance - In No Acute Distress] : no acute distress [Normal Conjunctiva] : the conjunctiva exhibited no abnormalities [Eyelids - No Xanthelasma] : the eyelids demonstrated no xanthelasmas [Normal Oral Mucosa] : normal oral mucosa [No Oral Pallor] : no oral pallor [No Oral Cyanosis] : no oral cyanosis [Normal Jugular Venous A Waves Present] : normal jugular venous A waves present [Normal Jugular Venous V Waves Present] : normal jugular venous V waves present [No Jugular Venous Thompson A Waves] : no jugular venous thompson A waves [Heart Rate And Rhythm] : heart rate and rhythm were normal [Heart Sounds] : normal S1 and S2 [Murmurs] : no murmurs present [Respiration, Rhythm And Depth] : normal respiratory rhythm and effort [Exaggerated Use Of Accessory Muscles For Inspiration] : no accessory muscle use [Auscultation Breath Sounds / Voice Sounds] : lungs were clear to auscultation bilaterally [Abdomen Soft] : soft [Abdomen Tenderness] : non-tender [Abdomen Mass (___ Cm)] : no abdominal mass palpated [Abnormal Walk] : normal gait [Gait - Sufficient For Exercise Testing] : the gait was sufficient for exercise testing [Nail Clubbing] : no clubbing of the fingernails [Cyanosis, Localized] : no localized cyanosis [Petechial Hemorrhages (___cm)] : no petechial hemorrhages [Skin Color & Pigmentation] : normal skin color and pigmentation [] : no rash [No Venous Stasis] : no venous stasis [Skin Lesions] : no skin lesions [No Skin Ulcers] : no skin ulcer [No Xanthoma] : no  xanthoma was observed [Oriented To Time, Place, And Person] : oriented to person, place, and time [Affect] : the affect was normal [Mood] : the mood was normal [No Anxiety] : not feeling anxious

## 2020-04-21 ENCOUNTER — APPOINTMENT (OUTPATIENT)
Dept: THORACIC SURGERY | Facility: HOSPITAL | Age: 85
End: 2020-04-21

## 2020-05-07 ENCOUNTER — APPOINTMENT (OUTPATIENT)
Dept: THORACIC SURGERY | Facility: HOSPITAL | Age: 85
End: 2020-05-07

## 2020-05-20 ENCOUNTER — APPOINTMENT (OUTPATIENT)
Dept: THORACIC SURGERY | Facility: HOSPITAL | Age: 85
End: 2020-05-20

## 2020-05-21 ENCOUNTER — APPOINTMENT (OUTPATIENT)
Dept: NEPHROLOGY | Facility: CLINIC | Age: 85
End: 2020-05-21
Payer: MEDICARE

## 2020-05-21 DIAGNOSIS — Z92.3 PERSONAL HISTORY OF IRRADIATION: ICD-10-CM

## 2020-05-21 DIAGNOSIS — Z85.46 PERSONAL HISTORY OF MALIGNANT NEOPLASM OF PROSTATE: ICD-10-CM

## 2020-05-21 DIAGNOSIS — Z87.891 PERSONAL HISTORY OF NICOTINE DEPENDENCE: ICD-10-CM

## 2020-05-21 PROCEDURE — 99204 OFFICE O/P NEW MOD 45 MIN: CPT | Mod: 95

## 2020-05-21 RX ORDER — CIPROFLOXACIN HYDROCHLORIDE 250 MG/1
250 TABLET, FILM COATED ORAL
Qty: 20 | Refills: 0 | Status: DISCONTINUED | COMMUNITY
Start: 2019-07-19 | End: 2020-05-21

## 2020-05-21 RX ORDER — LABETALOL HYDROCHLORIDE 100 MG/1
100 TABLET, FILM COATED ORAL
Refills: 0 | Status: DISCONTINUED | COMMUNITY
End: 2020-05-21

## 2020-05-21 RX ORDER — ALLOPURINOL 100 MG/1
100 TABLET ORAL DAILY
Refills: 0 | Status: DISCONTINUED | COMMUNITY
End: 2020-05-21

## 2020-05-21 RX ORDER — GABAPENTIN 100 MG/1
100 CAPSULE ORAL 3 TIMES DAILY
Refills: 0 | Status: DISCONTINUED | COMMUNITY
End: 2020-05-21

## 2020-05-21 RX ORDER — ATORVASTATIN CALCIUM 10 MG/1
10 TABLET, FILM COATED ORAL DAILY
Refills: 0 | Status: DISCONTINUED | COMMUNITY
End: 2020-05-21

## 2020-05-21 RX ORDER — CIPROFLOXACIN HYDROCHLORIDE 500 MG/1
500 TABLET, FILM COATED ORAL
Qty: 3 | Refills: 3 | Status: DISCONTINUED | COMMUNITY
Start: 2019-01-11 | End: 2020-05-21

## 2020-05-21 NOTE — ASSESSMENT
[FreeTextEntry1] : Laurent Phillips is an 87-year old male with a RUL squamous cell cancer, poorly controlled hypertension, and an elevated serum creatinine suggestive of stage IV chronic kidney disease who is here regarding his poorly controlled hypertension and elevated serum creatinine.  His blood pressures are running around 150-170/80-85 on metoprolol succinate 50 mg once daily and amlodipine 5 mg twice daily.  He avoids salt "most of the time" and has not used any in the last few days.\par \par His BUN/creatinine trend is as follows: 47/2.11 (01/25/2019),  68/2.5 (02/21/2020), 80/2.7 (02/23/2020), 58/2.69 (05/04/2020).  Mr. Phillips is unaware of any history of kidney disease.  The lab studies from February 2020 were done during an admission to [facility] for a nose bleed.\par \par IMPRESSION:\par \par (1) Stage IV CKD.  The serum creatinine from January 2019 was 2.11 mg/dL and has since progressed.  I am uncertain why though suspect that hypertension has been a significant contributor.  \par \par (2) Hypertension.  Unclear if this is primary or secondary. Mr. Phillips reports he saw Dr. Guadarrama in 2020.  Mr. Phillips reports his blood pressure was "a little high" though is unsure how elevated.  It appears to be better controlled on amlodipine raising the suspicion that there is likely a large sodium-volume mediated component here.  \par \par (3) Lung malignancy.\par \par RECOMMENDATIONS:\par \par The short term goal here is to gain better blood pressure control so that Mr. Phillips can go through his surgical procedure.  I will rule out secondary etiologies and treat the hypertension.\par -I ordered the lab studies noted below\par -I would like to avoid an ACE inhibitor or ARB in this gentleman with stage IV CKD who tends to be hyperkalemic.\par -I would like to avoid a diuretic at this point given the elevated serum creatinine and possible recent worsening\par -Mr. Phillips had a wakening pulse of 65 upon waking today.  Repeat values were 72 and then 64.  I increased the metoprolol succinate 50 mg to twice daily. He will follow his blood pressures and call me in 3 days. I asked him to page me sooner if his pulse decreases below 50 bpm OR if he has any questions or issues.  I will consider changing the calcium channel blocker or adding hydralazine if needed. \par -I will see him back in one week.\par \par

## 2020-05-21 NOTE — HISTORY OF PRESENT ILLNESS
[Home] : at home, [unfilled] , at the time of the visit. [Medical Office: ([facility])___] : at the medical office located in  [Verbal consent obtained from patient] : the patient, [unfilled] [FreeTextEntry4] : Danyelle Mittal [FreeTextEntry1] : Laurent Phillips is an 87-year old male who was scheduled for a RUL lobectomy for a squamous cell cancer at [facility] which was cancelled because of severe hypertension (/110) noted during a pre operative exam. He was sent to the [facility] ED, had his blood pressures better controlled and he was discharged home.  During a second pre operative clearance his blood pressures were around 220/105.  He went directly home. While at [facility] (on 05/04/2020) he was noted to have BUN/creatinine levels of 58/2.69.  He has been referred by Dr. Elle Hong for these reasons.\par \par Mr. Phillips was taking metoprolol succinate 50 mg once daily when he presented to [facility] on 05/04.  He was discharged on the same medication.  During his second visit for clearance for the surgery he was started on amlodipine 5 mg PO twice daily.  His blood pressures today were 171/85 (prior to the AM medications), 159/84 (1 PM), 154/80 (3:30 PM).  He currently feels "good".

## 2020-05-21 NOTE — REVIEW OF SYSTEMS
[SOB on Exertion] : shortness of breath during exertion [Negative] : Heme/Lymph [Wheezing] : no wheezing [Cough] : no cough [FreeTextEntry2] : Intentional lost weight 233 pounds --> 189 pounds over a 1 year period.  Cut back on portions.  Weight has been stable since the weight loss.  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  Takes no over the counter medications.

## 2020-05-21 NOTE — CONSULT LETTER
[Dear  ___] : Dear  [unfilled], [Consult Letter:] : I had the pleasure of evaluating your patient, [unfilled]. [Please see my note below.] : Please see my note below. [Consult Closing:] : Thank you very much for allowing me to participate in the care of this patient.  If you have any questions, please do not hesitate to contact me. [Sincerely,] : Sincerely, [DrJazmín  ___] : Dr. RIVAS [FreeTextEntry3] : Kenton Doherty

## 2020-06-08 ENCOUNTER — RESULT REVIEW (OUTPATIENT)
Age: 85
End: 2020-06-08

## 2020-06-09 ENCOUNTER — APPOINTMENT (OUTPATIENT)
Dept: NEPHROLOGY | Facility: CLINIC | Age: 85
End: 2020-06-09
Payer: MEDICARE

## 2020-06-09 ENCOUNTER — APPOINTMENT (OUTPATIENT)
Dept: NEPHROLOGY | Facility: CLINIC | Age: 85
End: 2020-06-09

## 2020-06-09 PROCEDURE — 99214 OFFICE O/P EST MOD 30 MIN: CPT | Mod: 95

## 2020-06-09 NOTE — REVIEW OF SYSTEMS
[Cough] : no cough [SOB on Exertion] : shortness of breath during exertion [Negative] : Heme/Lymph [FreeTextEntry2] : Intentional lost weight 233 pounds --> 189 pounds over a 1 year period.  Cut back on portions.  Weight has beenstable since the weight loss.  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  Takes no over the counter medications. [de-identified] : left leg falls asleep at times since the sciatic nerve surgery around 2 years ago

## 2020-06-09 NOTE — ASSESSMENT
[FreeTextEntry1] : Laurent Phillips is an 87-year old male with a RUL squamous cell cancer, poorly controlled hypertension, and an elevated serum creatinine suggestive of stage IV chronic kidney disease who is here regarding his poorly controlled hypertension and elevated serum creatinine.  His blood pressures are running around 150-170/80-85 on metoprolol succinate 50 mg once daily and amlodipine 5 mg twice daily.  He avoids salt "most of the time" and has not used any in the last few days.\par \par His BUN/creatinine trend is as follows: 47/2.11 (01/25/2019),  68/2.5 (02/21/2020), 80/2.7 (02/23/2020), 58/2.69 (05/04/2020).  Mr. Phillips is unaware of any history of kidney disease.  The lab studies from February 2020 were done during an admission to [facility] for a nose bleed.\par \par IMPRESSION:\par \par (1) Stage IV CKD.  The serum creatinine from January 2019 was 2.11 mg/dL and has since progressed.  I am uncertain why though suspect that hypertension has been a significant contributor.  \par \par (2) Hypertension.  Unclear if this is primary or secondary. The response to amlodipine suggests that there is a significant sodium-volume mediated component to the hypertension.  The blood pressures are not yet at goal.  Mr. Phillips felt dizzy on amlodipine 5 mg twice daily and so decreased the dose. He will likely have to get used to his current blood pressure prior to lowering it further.  This should not  the way of lung surgery. \par \par (3) Lung malignancy.\par \par RECOMMENDATIONS:\par \par (1) The hypertension should not prevent Mr Phillips from going through his lung surgery.  I would very much like to rule out secondary causes of hypertension in the future.  Mr. Phillips  will do these labs when he goes for his presurgical workup. \par \par (2) Continue the metoprolol succinate.   Continue the amlodipine.  I would like to avoid an ACE inhibitor or ARB in this gentleman with stage IV CKD and pior hyperkalemia.  I would like to avoid a diuretic at this point given the elevated serum creatinine. \par \par (3) Avoid all nephrotoxins now and in the perioperative period. \par \par (4) Please forward the pre surgical lab studies to my office. \par \par (5) Mr. Phillips will return to me for follow up after the surgery so that I can address his renal issues. \par

## 2020-06-09 NOTE — HISTORY OF PRESENT ILLNESS
[Home] : at home, [unfilled] , at the time of the visit. [Medical Office: ([facility])___] : at the medical office located in  [Verbal consent obtained from patient] : the patient, [unfilled] [FreeTextEntry4] : Danyelle Mittal [FreeTextEntry1] : \par Laurent Phillips is an 87-year old male who was scheduled for a RUL lobectomy for a squamous cell cancer at [facility] which was cancelled because of severe hypertension (/110) noted during a pre operative exam. He was sent to the [facility] ED, had his blood pressures better controlled and he was discharged home.  During a second pre operative clearance his blood pressures were around 220/105.  He went directly home. While at [facility] (on 2020) he was noted to have BUN/creatinine levels of 58/2.69.  He has been referred by Dr. Elle Hong for these reasons.\par \par Mr. Phillips was taking metoprolol succinate 50 mg once daily when he presented to [facility] on .  He was discharged on the same medication.  During his second visit for clearance for the surgery he was started on amlodipine 5 mg PO twice daily.  His blood pressures today were 171/85 (prior to the AM medications), 159/84 (1 PM), 154/80 (3:30 PM).  \par \par I am seeing Mr. Phillips via telehealth video conferencing today. His blood pressures have been runnin/75 57  (9 AM), 158/83 63 (5:10 PM). The blood pressures have been running around these values. He decreased his amlodipine from 5 mg twice daily to 2.5 mg twice daily because he was becoming dizzy. .  He has not yet gotten blood tests.

## 2020-06-27 RX ORDER — AMLODIPINE BESYLATE 5 MG/1
5 TABLET ORAL
Refills: 0 | Status: DISCONTINUED | COMMUNITY
End: 2020-06-27

## 2020-07-30 ENCOUNTER — RESULT REVIEW (OUTPATIENT)
Age: 85
End: 2020-07-30

## 2020-07-31 ENCOUNTER — APPOINTMENT (OUTPATIENT)
Dept: CARDIOLOGY | Facility: CLINIC | Age: 85
End: 2020-07-31
Payer: MEDICARE

## 2020-07-31 VITALS
BODY MASS INDEX: 25.48 KG/M2 | SYSTOLIC BLOOD PRESSURE: 130 MMHG | DIASTOLIC BLOOD PRESSURE: 80 MMHG | HEART RATE: 55 BPM | HEIGHT: 71 IN | WEIGHT: 182 LBS

## 2020-07-31 DIAGNOSIS — J30.0 VASOMOTOR RHINITIS: ICD-10-CM

## 2020-07-31 DIAGNOSIS — Z82.49 FAMILY HISTORY OF ISCHEMIC HEART DISEASE AND OTHER DISEASES OF THE CIRCULATORY SYSTEM: ICD-10-CM

## 2020-07-31 DIAGNOSIS — R33.9 RETENTION OF URINE, UNSPECIFIED: ICD-10-CM

## 2020-07-31 DIAGNOSIS — Z85.51 PERSONAL HISTORY OF MALIGNANT NEOPLASM OF BLADDER: ICD-10-CM

## 2020-07-31 DIAGNOSIS — J43.2 CENTRILOBULAR EMPHYSEMA: ICD-10-CM

## 2020-07-31 DIAGNOSIS — J61 PNEUMOCONIOSIS DUE TO ASBESTOS AND OTHER MINERAL FIBERS: ICD-10-CM

## 2020-07-31 PROCEDURE — 93000 ELECTROCARDIOGRAM COMPLETE: CPT

## 2020-07-31 PROCEDURE — 99214 OFFICE O/P EST MOD 30 MIN: CPT

## 2020-08-06 ENCOUNTER — RESULT REVIEW (OUTPATIENT)
Age: 85
End: 2020-08-06

## 2020-08-06 NOTE — HISTORY OF PRESENT ILLNESS
[FreeTextEntry1] : This 87 year old male with lung cancer is been seen for cardiac risks. he has previously see Dr Mazariegos but is requesting change in care as his family members are seen here.\par He is anticipating robotic lung surgery by Dr Vu August 12th, [facility] for increase in size of a multilobulated and spiculated mass at the posterior aspect of the RUL.\par He denies chest pain, dyspnea,palpitations or syncope.\par he walks with a cane "for security"  he can walk to his car and back without difficulty.\par He has coronary calcification on CT 7/30/20.\par

## 2020-08-06 NOTE — ADDENDUM
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 There is no evidence of active ischemia or CHF, there is no cardiac contraindication to planned procedure/surgery.\par He should begin lo dose aspirin when feasible postop.

## 2020-08-12 ENCOUNTER — APPOINTMENT (OUTPATIENT)
Dept: THORACIC SURGERY | Facility: HOSPITAL | Age: 85
End: 2020-08-12

## 2020-08-14 ENCOUNTER — RX RENEWAL (OUTPATIENT)
Age: 85
End: 2020-08-14

## 2020-08-27 ENCOUNTER — APPOINTMENT (OUTPATIENT)
Dept: THORACIC SURGERY | Facility: CLINIC | Age: 85
End: 2020-08-27

## 2020-09-01 ENCOUNTER — APPOINTMENT (OUTPATIENT)
Dept: HEMATOLOGY ONCOLOGY | Facility: CLINIC | Age: 85
End: 2020-09-01
Payer: MEDICARE

## 2020-09-01 VITALS
OXYGEN SATURATION: 99 % | TEMPERATURE: 97.7 F | DIASTOLIC BLOOD PRESSURE: 73 MMHG | HEART RATE: 73 BPM | BODY MASS INDEX: 25.76 KG/M2 | WEIGHT: 184 LBS | HEIGHT: 70.67 IN | SYSTOLIC BLOOD PRESSURE: 117 MMHG | RESPIRATION RATE: 18 BRPM

## 2020-09-01 PROCEDURE — 99205 OFFICE O/P NEW HI 60 MIN: CPT

## 2020-09-01 NOTE — REASON FOR VISIT
[Initial Consultation] : an initial consultation [FreeTextEntry2] : recurrence lung cancer - now Right - adenocarcinoma and Squamous cell

## 2020-09-01 NOTE — ASSESSMENT
[FreeTextEntry1] : RUL lung cancer\par 2 Foci- squamous cell and adenocarcinoma\par Pathological stage pT2**(2*) N0\par Visceral pleural involvement positive\par \par Discussed at length about the diagnosis, staging, treatment options and prognosis\par Discussed about the indications for adjuvant systemic therapy- he has 1 risk factor which is visceral pleural involvement. Although the data about adjuvant therapy in stage I lung cancer is unclear. \par Discussed about adjuvant osimertinib if his tumor harbored EGFR mutation based on  the phase 3 trial which had included IB tumors. \par Given his age and comorbidities, he declines adjuvant systemic therapy\par I do not disagree with him especially given his overall performance status and comorbidities\par Plan\par Repeat CT chest in 3 months \par \par Left lung cancer\par s/p SHONNA resection in 2012\par \par History of bladder cancer and prostate cancer\par Follows with Dr Bolaños\par \par Follow up in 3 months\par CBC, CMP, PSA, LDH

## 2020-09-01 NOTE — HISTORY OF PRESENT ILLNESS
[de-identified] : Mr. Laurent Phillips is 87 year old male with recurrent R lung cancer referred by Dr. Vu for further evaluation.\par \par Patient with medical history of renal insufficiency, htn, nocturia who was diagnosed with  prostate cancer in 2012 s/p chemoradiation, high grade noninvasive bladder cancer in 2012 during work up for prostate cancer s/p surgery, recurrence in 2015 s/p surgery with Dr. Ledesma. Patient was also found to have left lung cancer s/p left left lobectomy with Dr. Vu 2012 during further work up for bladder cancer.  Patient now with recurrence of lung cancer of right lung with scans and surgery below.\par \par 3/6/20 - Right upper lung, biopsy: Squamous cell carcinoma.\par PD-L1 < 1%\par \par 6/8/20 Ct of abdomen/pelvis -  No significant change in size or appearance of either kidney since prior sonogram of 9/14/2017, with diffuse increased cortical parenchymal echogenicity and cortical parenchymal thinning that are compatible with medical renal disease.\par \par Unremarkable appearance of urinary bladder with 62 cc post void residual.\par 7/30/20 Ct scan -Increase in size of a multilobulated and spiculated mass at the posterior aspect of the right upper lobe that is difficult to accurately measure due to multilobulation, but the bulkiest portion of which currently measures 2 x 1.2 x 1.8 cm at lung windows, compared to 1.5 x 1.1 x 1.5 cm on 2/3/2020. There has been no change in multiple additional smaller bilateral pulmonary nodules\par \par 8/12/20  RUL posterior segmentectomy\par Pathology:  Pathologic Staging:  pT2**(2*) N0\par -Moderate differentiated squamous cell carcinoma: size - 2.8 cm\par -Pleural invasion to the visceral pleural surface present and supported by elastic tissue special stain performed on 4 blocks - associated focal squamous cell carcinoma\par -Adenocarcinoma: size 1.3 cm. \par -Types: Acinar (70%), micropapillary (20%), and papillary (10%)\par -Spread thorugh alveolar spaces present.\par -Narrowest parenchymal margin: < 1mm from tissue inked underlying the 2mm thick stapled line. \par -Additional findings: atypical adenomatous hyperplasia (AAH)\par -negative for metasetasis in one incidental LN (0/1).\par \par Patient with no family history of hematology and/or oncological disorders.  He was a prior smoker - smoked about a pack a day x  22 years (quit 50 yrs ago). \par \par Patient with SOB on exertion but comfortable sitting still.  Last PSA checked two weeks ago, been normalized. \par \par

## 2020-09-01 NOTE — PHYSICAL EXAM
[Ambulatory and capable of all self care but unable to carry out any work activities] : Status 2- Ambulatory and capable of all self care but unable to carry out any work activities. Up and about more than 50% of waking hours [Normal] : affect appropriate [de-identified] : Difficulty hearing BL [de-identified] : D

## 2020-09-30 ENCOUNTER — APPOINTMENT (OUTPATIENT)
Dept: CARDIOLOGY | Facility: CLINIC | Age: 85
End: 2020-09-30

## 2020-10-05 ENCOUNTER — APPOINTMENT (OUTPATIENT)
Dept: CARDIOLOGY | Facility: CLINIC | Age: 85
End: 2020-10-05

## 2020-10-16 ENCOUNTER — RX RENEWAL (OUTPATIENT)
Age: 85
End: 2020-10-16

## 2020-10-26 ENCOUNTER — RESULT REVIEW (OUTPATIENT)
Age: 85
End: 2020-10-26

## 2020-11-04 ENCOUNTER — RESULT REVIEW (OUTPATIENT)
Age: 85
End: 2020-11-04

## 2020-11-04 ENCOUNTER — APPOINTMENT (OUTPATIENT)
Dept: HEMATOLOGY ONCOLOGY | Facility: CLINIC | Age: 85
End: 2020-11-04
Payer: MEDICARE

## 2020-11-04 VITALS
BODY MASS INDEX: 25.38 KG/M2 | WEIGHT: 181.3 LBS | OXYGEN SATURATION: 100 % | SYSTOLIC BLOOD PRESSURE: 152 MMHG | HEIGHT: 70.67 IN | HEART RATE: 84 BPM | DIASTOLIC BLOOD PRESSURE: 88 MMHG | TEMPERATURE: 97.4 F | RESPIRATION RATE: 18 BRPM

## 2020-11-04 DIAGNOSIS — R91.1 SOLITARY PULMONARY NODULE: ICD-10-CM

## 2020-11-04 PROCEDURE — 99072 ADDL SUPL MATRL&STAF TM PHE: CPT

## 2020-11-04 PROCEDURE — 99215 OFFICE O/P EST HI 40 MIN: CPT

## 2020-11-04 NOTE — ASSESSMENT
[FreeTextEntry1] : RUL lung cancer\par 2 Foci- squamous cell and adenocarcinoma\par Pathological stage pT2**(2*) N0\par Visceral pleural involvement positive\par Opted against adjuvant treatment\par \par He is seen today for follow up\par No cough, sob\par Right apical densities- ? post op vs residual\par Discussed about obtaining PET for better evaluation\par He is agreeable\par Schedule PET\par \par Thrombocytopenia\par Seen on blood work done with his PCP in September 2020\par He had PPM placement for CHB at [facility] end of August\par ? Medication induced vs alternate etiology\par He does not remember all his medications. Advised to call me with meds or bring it for follow up appointment\par Monitor for bleeding\par Send blood work today including platelets in a blue top tube\par \par Left lung cancer\par s/p SHONNA resection in 2012\par \par History of bladder cancer and prostate cancer\par Follows with Dr Bloaños\par \par Follow up in 3 weeks\par CBC

## 2020-11-04 NOTE — PHYSICAL EXAM
[Ambulatory and capable of all self care but unable to carry out any work activities] : Status 2- Ambulatory and capable of all self care but unable to carry out any work activities. Up and about more than 50% of waking hours [Normal] : affect appropriate [de-identified] : Difficulty hearing BL

## 2020-11-04 NOTE — HISTORY OF PRESENT ILLNESS
[de-identified] : Mr. Laurent Phillips is 87 year old male with recurrent R lung cancer referred by Dr. Vu for further evaluation.\par \par Patient with medical history of renal insufficiency, htn, nocturia who was diagnosed with  prostate cancer in 2012 s/p chemoradiation, high grade noninvasive bladder cancer in 2012 during work up for prostate cancer s/p surgery, recurrence in 2015 s/p surgery with Dr. Ledesma. Patient was also found to have left lung cancer s/p left left lobectomy with Dr. Vu 2012 during further work up for bladder cancer.  Patient now with recurrence of lung cancer of right lung with scans and surgery below.\par \par 3/6/20 - Right upper lung, biopsy: Squamous cell carcinoma.\par PD-L1 < 1%\par \par 6/8/20 Ct of abdomen/pelvis -  No significant change in size or appearance of either kidney since prior sonogram of 9/14/2017, with diffuse increased cortical parenchymal echogenicity and cortical parenchymal thinning that are compatible with medical renal disease.\par \par Unremarkable appearance of urinary bladder with 62 cc post void residual.\par 7/30/20 Ct scan -Increase in size of a multilobulated and spiculated mass at the posterior aspect of the right upper lobe that is difficult to accurately measure due to multilobulation, but the bulkiest portion of which currently measures 2 x 1.2 x 1.8 cm at lung windows, compared to 1.5 x 1.1 x 1.5 cm on 2/3/2020. There has been no change in multiple additional smaller bilateral pulmonary nodules\par \par 8/12/20  RUL posterior segmentectomy\par Pathology:  Pathologic Staging:  pT2**(2*) N0\par -Moderate differentiated squamous cell carcinoma: size - 2.8 cm\par -Pleural invasion to the visceral pleural surface present and supported by elastic tissue special stain performed on 4 blocks - associated focal squamous cell carcinoma\par -Adenocarcinoma: size 1.3 cm. \par -Types: Acinar (70%), micropapillary (20%), and papillary (10%)\par -Spread thorugh alveolar spaces present.\par -Narrowest parenchymal margin: < 1mm from tissue inked underlying the 2mm thick stapled line. \par -Additional findings: atypical adenomatous hyperplasia (AAH)\par -negative for metasetasis in one incidental LN (0/1).\par \par Patient with no family history of hematology and/or oncological disorders.  He was a prior smoker - smoked about a pack a day x  22 years (quit 50 yrs ago). \par \par Patient with SOB on exertion but comfortable sitting still.  Last PSA checked two weeks ago, been normalized. \par \par  [de-identified] : HE is seen today for follow up\par \par Reports end of September, he passed out s/p fall nad hit his head. Was taken to [facility]. Was found to have CHB s/p PPM\par He feels better since\par \par No coughing, sob or hemoptysis\par Occasional nose bleeds when he blows his nose\par No blood in the stools or blood in the urine\par \par

## 2020-11-04 NOTE — REASON FOR VISIT
[Follow-Up Visit] : a follow-up [FreeTextEntry2] : recurrence lung cancer - now Right - adenocarcinoma and Squamous cell

## 2020-11-17 ENCOUNTER — APPOINTMENT (OUTPATIENT)
Dept: NEPHROLOGY | Facility: CLINIC | Age: 85
End: 2020-11-17
Payer: MEDICARE

## 2020-11-17 PROCEDURE — 99214 OFFICE O/P EST MOD 30 MIN: CPT | Mod: 95

## 2020-11-17 RX ORDER — TRAMADOL HYDROCHLORIDE 50 MG/1
50 TABLET, COATED ORAL
Qty: 10 | Refills: 0 | Status: DISCONTINUED | COMMUNITY
Start: 2020-08-17 | End: 2020-11-17

## 2020-11-17 RX ORDER — ATORVASTATIN CALCIUM 10 MG/1
10 TABLET, FILM COATED ORAL
Refills: 0 | Status: DISCONTINUED | COMMUNITY
End: 2020-11-17

## 2020-11-17 RX ORDER — PREDNISONE 20 MG/1
20 TABLET ORAL
Qty: 20 | Refills: 0 | Status: DISCONTINUED | COMMUNITY
Start: 2020-06-22 | End: 2020-11-17

## 2020-11-17 RX ORDER — METOPROLOL SUCCINATE 50 MG/1
50 TABLET, EXTENDED RELEASE ORAL
Qty: 90 | Refills: 0 | Status: DISCONTINUED | COMMUNITY
Start: 2020-09-18 | End: 2020-11-17

## 2020-11-17 RX ORDER — METHYLPREDNISOLONE 4 MG/1
4 TABLET ORAL
Qty: 21 | Refills: 0 | Status: DISCONTINUED | COMMUNITY
Start: 2020-06-12 | End: 2020-11-17

## 2020-12-10 ENCOUNTER — RESULT REVIEW (OUTPATIENT)
Age: 85
End: 2020-12-10

## 2020-12-11 ENCOUNTER — RESULT REVIEW (OUTPATIENT)
Age: 85
End: 2020-12-11

## 2020-12-11 ENCOUNTER — APPOINTMENT (OUTPATIENT)
Dept: HEMATOLOGY ONCOLOGY | Facility: CLINIC | Age: 85
End: 2020-12-11
Payer: MEDICARE

## 2020-12-11 VITALS
HEART RATE: 79 BPM | HEIGHT: 70.67 IN | TEMPERATURE: 97.4 F | DIASTOLIC BLOOD PRESSURE: 80 MMHG | OXYGEN SATURATION: 100 % | BODY MASS INDEX: 24.58 KG/M2 | WEIGHT: 175.6 LBS | RESPIRATION RATE: 18 BRPM | SYSTOLIC BLOOD PRESSURE: 173 MMHG

## 2020-12-11 VITALS
SYSTOLIC BLOOD PRESSURE: 173 MMHG | DIASTOLIC BLOOD PRESSURE: 80 MMHG | TEMPERATURE: 97.4 F | RESPIRATION RATE: 18 BRPM | HEART RATE: 79 BPM | HEIGHT: 70.67 IN | WEIGHT: 175.6 LBS | BODY MASS INDEX: 24.58 KG/M2 | OXYGEN SATURATION: 100 %

## 2020-12-11 PROCEDURE — 99214 OFFICE O/P EST MOD 30 MIN: CPT

## 2020-12-11 PROCEDURE — 99072 ADDL SUPL MATRL&STAF TM PHE: CPT

## 2020-12-11 NOTE — HISTORY OF PRESENT ILLNESS
[de-identified] : Mr. Laurent Phillips is 87 year old male with recurrent R lung cancer referred by Dr. Vu for further evaluation.\par \par Patient with medical history of renal insufficiency, htn, nocturia who was diagnosed with  prostate cancer in 2012 s/p chemoradiation, high grade noninvasive bladder cancer in 2012 during work up for prostate cancer s/p surgery, recurrence in 2015 s/p surgery with Dr. Ledesma. Patient was also found to have left lung cancer s/p left left lobectomy with Dr. Vu 2012 during further work up for bladder cancer.  Patient now with recurrence of lung cancer of right lung with scans and surgery below.\par \par 3/6/20 - Right upper lung, biopsy: Squamous cell carcinoma.\par PD-L1 < 1%\par \par 6/8/20 Ct of abdomen/pelvis -  No significant change in size or appearance of either kidney since prior sonogram of 9/14/2017, with diffuse increased cortical parenchymal echogenicity and cortical parenchymal thinning that are compatible with medical renal disease.\par \par Unremarkable appearance of urinary bladder with 62 cc post void residual.\par 7/30/20 Ct scan -Increase in size of a multilobulated and spiculated mass at the posterior aspect of the right upper lobe that is difficult to accurately measure due to multilobulation, but the bulkiest portion of which currently measures 2 x 1.2 x 1.8 cm at lung windows, compared to 1.5 x 1.1 x 1.5 cm on 2/3/2020. There has been no change in multiple additional smaller bilateral pulmonary nodules\par \par 8/12/20  RUL posterior segmentectomy\par Pathology:  Pathologic Staging:  pT2**(2*) N0\par -Moderate differentiated squamous cell carcinoma: size - 2.8 cm\par -Pleural invasion to the visceral pleural surface present and supported by elastic tissue special stain performed on 4 blocks - associated focal squamous cell carcinoma\par -Adenocarcinoma: size 1.3 cm. \par -Types: Acinar (70%), micropapillary (20%), and papillary (10%)\par -Spread thorugh alveolar spaces present.\par -Narrowest parenchymal margin: < 1mm from tissue inked underlying the 2mm thick stapled line. \par -Additional findings: atypical adenomatous hyperplasia (AAH)\par -negative for metasetasis in one incidental LN (0/1).\par \par Patient with no family history of hematology and/or oncological disorders.  He was a prior smoker - smoked about a pack a day x  22 years (quit 50 yrs ago). \par \par Patient with SOB on exertion but comfortable sitting still.  Last PSA checked two weeks ago, been normalized. \par \par Reports end of September 2020, he passed out s/p fall nad hit his head. Was taken to [facility]. Was found to have CHB s/p PPM\par \par  [de-identified] : HE is seen today for follow up\par \par He feels better overall\par No coughing, sob or hemoptysis\par \par PET/CT (12/5/20)\par s/p RUL lobectomy. No additional suspicious pulmonary nodules or FDG avid malignancy

## 2020-12-11 NOTE — ASSESSMENT
[FreeTextEntry1] : RUL lung cancer\par 2 Foci- squamous cell and adenocarcinoma\par Pathological stage pT2**(2*) N0\par Visceral pleural involvement positive\par Opted against adjuvant treatment\par He is seen today for follow up\par No cough, sob\par Right apical densities- ? post op vs residual\par PET (12/5- NO fdg avid malignancy\par No interventions needed for now\par \par Thrombocytopenia\par Seen on blood work done with his PCP in September 2020\par He had PPM placement for CHB at [facility] end of August\par Likely Medication induced \par Repeat blood work today shows improving platelets\par \par Left lung cancer\par s/p SHONNA resection in 2012\par \par History of bladder cancer and prostate cancer\par Follows with Dr Bolaños\par \par Follow up in 3 months\par CBC, CMP

## 2020-12-11 NOTE — PHYSICAL EXAM
[Ambulatory and capable of all self care but unable to carry out any work activities] : Status 2- Ambulatory and capable of all self care but unable to carry out any work activities. Up and about more than 50% of waking hours [Normal] : affect appropriate [de-identified] : Difficulty hearing BL

## 2020-12-11 NOTE — RESULTS/DATA
[FreeTextEntry1] : Labs, path, imaging reviewed and discussed\par \par Platelets normal -> 67 (9/2020) -> 49 -> 101\par \par

## 2020-12-17 ENCOUNTER — RX RENEWAL (OUTPATIENT)
Age: 85
End: 2020-12-17

## 2020-12-29 NOTE — HISTORY OF PRESENT ILLNESS
[Home] : at home, [unfilled] , at the time of the visit. [Medical Office: ([facility])___] : at the medical office located in  [Verbal consent obtained from patient] : the patient, [unfilled] [FreeTextEntry4] : Danyelle Mittal [FreeTextEntry1] : \par Laurent Phillips is an 87-year old male who was scheduled for a RUL lobectomy for a squamous cell cancer at [facility] which was cancelled because of severe hypertension (/110) noted during a pre operative exam. He was sent to the [facility] ED, had his blood pressures better controlled and he was discharged home.  During a second pre operative clearance his blood pressures were around 220/105.  He went directly home. While at [facility] (on 05/04/2020) he was noted to have BUN/creatinine levels of 58/2.69.  He was referred by Dr. Elle Hong. \par \par Mr. Phillips was taking metoprolol succinate 50 mg once daily when he presented to [facility] on 05/04.  He was discharged on the same medication.  During his second visit for clearance for the surgery he was started on amlodipine 5 mg PO twice daily.  His blood pressures today were 171/85 (prior to the AM medications), 159/84 (1 PM), 154/80 (3:30 PM).  \par \par Mr. Phillips had his partial lobectomy in August 2020.  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 \par \par I am seeing Mr. Phillips via telehealth video conferencing today.  He was admitted to [facility] after a fall with head trauma.  He had complete heart block.  A permanent pacemaker was placed.  His medical regimen was modified.   The nifedipine ER was incorrectly increased from 30 to 60 mg once daily.  Mr. Phillips reports that this was supposed to be for another patient with the same name.  He became ill, and went back to the 30 mg dose.  Currently he is feeling "pretty good".   He is currently using a cane to ambulate. \par \par

## 2020-12-29 NOTE — CONSULT LETTER
[Dear  ___] : Dear  [unfilled], [Consult Letter:] : I had the pleasure of evaluating your patient, [unfilled]. [Please see my note below.] : Please see my note below. [Consult Closing:] : Thank you very much for allowing me to participate in the care of this patient.  If you have any questions, please do not hesitate to contact me. [Sincerely,] : Sincerely, [FreeTextEntry3] : Kenton Doherty

## 2020-12-29 NOTE — ASSESSMENT
[FreeTextEntry1] : Laurent Phillips is an 87-year old male with a RUL squamous cell cancer s/p partial lobectomy (08/2020), poorly controlled hypertension with recent improved control, and an elevated serum creatinine consistent with  stage IV chronic kidney disease who I am seeing regarding his renal disease.  He underwent lung surgery in August 2020 and then PPM placement for complete heart block in late September 2020.  He is feeling well.  He reports his last measured systolic pressure was under 140 mm Hg. \par \par Mr. Phillips's BUN/creatinine levels have trended as follows:  47/2.11 (01/25/2019),  68/2.5 (02/21/2020), 80/2.7 (02/23/2020), 58/2.69 (05/04/2020), 51/3.80 (08/24/2020), 69/2.81 (09/16/2020), 85/3.70 (10/01/2020),74/2.99 (10/15/2020),  70/2.8 (11/04/2020).  The YUE event of 10/01 occurred soon after the complete heart block and PPM placement\par \par The renal ultrasound of 06/08/2020 demonstrated a 9.4 cm right kidney and 10 cm left kidney with diffuse bilateral increased renal cortical parenchymal echogenicity and cortical parenchymal thinning.  The Doppler evaluation demonstrated grossly patent bilateral renal arterial and venous flow. \par \par \par IMPRESSION:\par \par (1) Stage IV CKD.  The serum creatinine from January 2019 was 2.11 mg/dL and has since progressed.  I am uncertain why though suspect that hypertension has been a significant contributor.  \par \par (2) Hypertension.  Unclear if this is primary or secondary. The response to amlodipine, and now nifedipine,  suggests that there is a significant sodium-volume mediated component to the hypertension.  \par \par (3) Lung malignancy.\par \par RECOMMENDATIONS:\par \par (1) Continue the current medication regimen.\par \par (2)  I would like to avoid an ACE inhibitor or ARB in this gentleman with stage IV CKD and prior hyperkalemia. \par \par (3) We talked about how to protect the kidneys:\par  -Good blood pressure control\par -Avoid the use of NSAIDS (ibuprofen, Motrin, Aleve,Celebrex, etc.).  Okay to use Tylenol.\par -Avoid salt 'like the plague'.  Limit sodium intake.  Do not add salt to your food.\par -Limit the intake of animal products.  Avoid high animal protein diets.\par -Stay well hydrated.\par -Good cholesterol control.\par \par (4) Limit potassium intake.  I explained that it is okay to have pasta and gravy once weekly.\par \par (5) We talked about how the above issues are important as Mr. Phillips might face the need to make a choice about dialysis in his life time.\par \par (6) Repeat lab studies were ordered for next month. \par \par (5) Mr. Phillips will return to me for follow up after the surgery so that I can address his renal issues. \par

## 2020-12-29 NOTE — REVIEW OF SYSTEMS
[Negative] : Integumentary [Cough] : no cough [FreeTextEntry2] : Intentional lost weight 233 pounds --> 173 over the past 18 months.   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  Takes no over the counter medications.

## 2021-02-25 ENCOUNTER — APPOINTMENT (OUTPATIENT)
Dept: HEART AND VASCULAR | Facility: CLINIC | Age: 86
End: 2021-02-25
Payer: MEDICARE

## 2021-02-25 PROCEDURE — 36415 COLL VENOUS BLD VENIPUNCTURE: CPT

## 2021-02-25 PROCEDURE — 99072 ADDL SUPL MATRL&STAF TM PHE: CPT

## 2021-03-01 ENCOUNTER — APPOINTMENT (OUTPATIENT)
Dept: NEPHROLOGY | Facility: CLINIC | Age: 86
End: 2021-03-01
Payer: MEDICARE

## 2021-03-01 VITALS
HEART RATE: 90 BPM | WEIGHT: 178 LBS | OXYGEN SATURATION: 96 % | HEIGHT: 70.67 IN | SYSTOLIC BLOOD PRESSURE: 150 MMHG | DIASTOLIC BLOOD PRESSURE: 68 MMHG | RESPIRATION RATE: 16 BRPM | BODY MASS INDEX: 24.92 KG/M2 | TEMPERATURE: 97.3 F

## 2021-03-01 DIAGNOSIS — Z00.00 ENCOUNTER FOR GENERAL ADULT MEDICAL EXAMINATION W/OUT ABNORMAL FINDINGS: ICD-10-CM

## 2021-03-01 PROCEDURE — 99072 ADDL SUPL MATRL&STAF TM PHE: CPT

## 2021-03-01 PROCEDURE — 99214 OFFICE O/P EST MOD 30 MIN: CPT

## 2021-03-01 RX ORDER — MUPIROCIN 20 MG/G
2 OINTMENT TOPICAL
Qty: 22 | Refills: 0 | Status: DISCONTINUED | COMMUNITY
Start: 2021-02-17 | End: 2021-03-01

## 2021-03-01 RX ORDER — COLCHICINE 0.6 MG/1
0.6 TABLET ORAL
Qty: 10 | Refills: 0 | Status: DISCONTINUED | COMMUNITY
Start: 2021-02-14 | End: 2021-03-01

## 2021-03-01 RX ORDER — SODIUM POLYSTYRENE SULFONATE 15 G/60ML
15 SUSPENSION ORAL; RECTAL
Qty: 900 | Refills: 0 | Status: DISCONTINUED | COMMUNITY
Start: 2021-01-20 | End: 2021-03-01

## 2021-03-01 RX ORDER — FUROSEMIDE 20 MG/1
20 TABLET ORAL
Qty: 30 | Refills: 1 | Status: DISCONTINUED | COMMUNITY
Start: 2020-11-04 | End: 2021-03-01

## 2021-03-01 RX ORDER — NIFEDIPINE 30 MG/1
30 TABLET, EXTENDED RELEASE ORAL
Qty: 90 | Refills: 1 | Status: DISCONTINUED | COMMUNITY
Start: 2020-06-27 | End: 2021-03-01

## 2021-03-01 NOTE — ASSESSMENT
[FreeTextEntry1] : Laurent Phillips is an 87-year old male with a RUL squamous cell cancer s/p partial lobectomy (08/2020), poorly controlled hypertension with recent improved control, and an elevated serum creatinine consistent with  stage IV chronic kidney disease who I am seeing regarding his renal disease.  He underwent lung surgery in August 2020 and then PPM placement for complete heart block in late September 2020.  He is feeling well.  He reports his last measured systolic pressure was under 140 mm Hg. \par \par Mr. Phillips's BUN/creatinine levels have trended as follows:  47/2.11 (01/25/2019),  68/2.5 (02/21/2020), 80/2.7 (02/23/2020), 58/2.69 (05/04/2020), 51/3.80 (08/24/2020), 69/2.81 (09/16/2020), 85/3.70 (10/01/2020),74/2.99 (10/15/2020),  70/2.8 (11/04/2020), and most recently 94/4.10 (02/16/2021), 91/4.25 (02/26/2021).   The YUE event of 10/01 occurred soon after the complete heart block and PPM placement  Mr. Phillips has been eating well.  He has had no nausea or vomiting.  He denies any recent diarrhea.  He uses no NSAIDS and has not used any antibiotics recently.  Mr. Phillips reports having a significant nose bleed resulting in 3 trips to the hospital.  He went to [facility] the first two times.  On the 3rd occasion he went to [facility] (02/13/2021)..  His Hb has trended as follows:  11.2 g/dL (02/11/2021), 9.5 g/dL (02/16/2021), 9.3 g/dL (02/25/2021).  The urine protein to creatinine ratio was 0.4 grams per gram (normal is 0.0-0.2).\par \par The renal ultrasound of 06/08/2020 demonstrated a 9.4 cm right kidney and 10 cm left kidney with diffuse bilateral increased renal cortical parenchymal echogenicity and cortical parenchymal thinning.  The Doppler evaluation demonstrated grossly patent bilateral renal arterial and venous flow. \par \par \par IMPRESSION:\par \par (1) Stage IV CKD.  The serum creatinine from January 2019 was 2.11 mg/dL and has since progressed.  I am uncertain why though suspect that hypertension has been a significant contributor.  \par \par (2) YUE.  I suspect this is due to the nosebleed which resulted in a loss of 2 units of blood.  This may have resulted inan ATN.  The rate of rise of the serum creatinine appears to be slowing down.  \par \par (3) Hypertension.  Today's blood pressure is above goal.\par \par (4) Anemia.  Due to the blood loss though there is likely a renal component.  The TSAT was 20% with a ferritin of 189 ng/mL  This may represent functional iron deficiency in stage IV CKD (TSAT < 20% is considered low in this patient population)\par \par (5) Low vitamin D.  The 25 hydroxy vitamin D level is 18.1 ng/dL\par \par (6) Secondary hyperparathyroidism.  The iPTH is 207 pg/mL.  This is likely related to the renal function and vitamin D deficiency\par \par (7) Lung malignancy.\par \par (8) Hyperkalemia.  This is mild. \par \par RECOMMENDATIONS:\par \par (1) Start taking oral iron (iron sulfate 325 mg once daily)\par \par (2) Start vitamin D 3000 IU once daily. \par \par (3)  Avoid an ACE inhibitor or ARB in this gentleman with stage IV CKD and prior hyperkalemia. Limit potassium in the diet. \par \par (4) We talked about how to protect the kidneys:\par  -Good blood pressure control\par -Avoid the use of NSAIDS (ibuprofen, Motrin, Aleve,Celebrex, etc.).  Okay to use Tylenol.\par -Avoid salt 'like the plague'.  Limit sodium intake.  Do not add salt to your food.\par -Limit the intake of animal products.  Avoid high animal protein diets.\par -Stay well hydrated.\par -Good cholesterol control.\par \par (5) Repeat the lab studies in 2-3 weeks.  I will call Mr. Phillips with the results. \par \par (6) We talked about how the above issues are important as Mr. Phillips might face the need to make a choice about dialysis in his life time. \par \par (7) Mr. Phillips will keep track of his blood pressure twice daily. \par \par (8) Return in 1 month.\par

## 2021-03-01 NOTE — REVIEW OF SYSTEMS
[Negative] : Heme/Lymph [Loss Of Hearing] : hearing loss [Shortness Of Breath] : shortness of breath [Cough] : no cough [Hesitancy] : no urinary hesitancy [FreeTextEntry2] : Intentional lost weight 233 pounds --> 173 --> 178 over the past 22 months.  Has been eating well. [FreeTextEntry3] : foreign body removal from eye (1976), lens implant (1996) [FreeTextEntry4] : this is not new [FreeTextEntry6] : this is intermittent [FreeTextEntry7] : denies having nausea, vomiting, or diarrhea [FreeTextEntry8] : nocturia x 1.  No urinary problems.   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  Takes no over the counter medications. [de-identified] : feels cold most of the time

## 2021-03-01 NOTE — HISTORY OF PRESENT ILLNESS
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 He was sent to the [facility] ED, had his blood pressures better controlled and he was discharged home.  During a second pre operative clearance his blood pressures were around 220/105.  He went directly home. While at [facility] (on 05/04/2020) he was noted to have BUN/creatinine levels of 58/2.69.  He was referred to me by Dr. Elle Hong. \par \par Mr. Phillips was taking metoprolol succinate 50 mg once daily when he presented to [facility] on 05/04/2020.  He was discharged on the same medication.  During his second visit for clearance for the surgery he was started on amlodipine 5 mg PO twice daily.  His blood pressures today were 171/85 (prior to the AM medications), 159/84 (1 PM), 154/80 (3:30 PM).  \par \par Mr. Phillips had his partial lobectomy in August 2020.  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 \par \par Mr. Phillips was admitted to [facility] in September 2020  after a fall with head trauma.  He had complete heart block.  A permanent pacemaker was placed.  His medical regimen was modified.   The nifedipine ER was incorrectly increased from 30 to 60 mg once daily.  Mr. Phillips reports that this was supposed to be for another patient with the same name.  He became ill, and went back to the 30 mg dose.  \par \par I am seeing Mr. Phillips today, in person, because of worsening renal function.  He is accompanied by his son. Mr. Phillips feels "great, I'm starting to get my strength back".  He denies having any nausea or vomiting.  He cotinues to have dyspnea at times.  This has not changed since his partial lobectomy in August.   He uses no pain medications.  He was prescribed colchicine though never took it. \par \par

## 2021-03-01 NOTE — PHYSICAL EXAM
[General Appearance - Alert] : alert [General Appearance - In No Acute Distress] : in no acute distress [Sclera] : the sclera and conjunctiva were normal [Neck Appearance] : the appearance of the neck was normal [] : no respiratory distress [Respiration, Rhythm And Depth] : normal respiratory rhythm and effort [Heart Rate And Rhythm] : heart rate was normal and rhythm regular [Heart Sounds] : normal S1 and S2 [Heart Sounds Gallop] : no gallops [Murmurs] : no murmurs [Heart Sounds Pericardial Friction Rub] : no pericardial rub [Edema] : there was no peripheral edema [Bowel Sounds] : normal bowel sounds [Abdomen Soft] : soft [Abdomen Tenderness] : non-tender [Involuntary Movements] : no involuntary movements were seen [Skin Color & Pigmentation] : normal skin color and pigmentation [No Focal Deficits] : no focal deficits [Oriented To Time, Place, And Person] : oriented to person, place, and time [Impaired Insight] : insight and judgment were intact [Affect] : the affect was normal [Mood] : the mood was normal

## 2021-03-03 LAB
25(OH)D3 SERPL-MCNC: 18.1 NG/ML
ALBUMIN SERPL ELPH-MCNC: 3.7 G/DL
ALP BLD-CCNC: 157 U/L
ALT SERPL-CCNC: 22 U/L
ANION GAP SERPL CALC-SCNC: 16 MMOL/L
AST SERPL-CCNC: 18 U/L
BASOPHILS # BLD AUTO: 0.07 K/UL
BASOPHILS NFR BLD AUTO: 0.7 %
BILIRUB SERPL-MCNC: <0.2 MG/DL
BUN SERPL-MCNC: 91 MG/DL
CALCIUM SERPL-MCNC: 8.2 MG/DL
CALCIUM SERPL-MCNC: 8.2 MG/DL
CHLORIDE SERPL-SCNC: 109 MMOL/L
CO2 SERPL-SCNC: 15 MMOL/L
CREAT SERPL-MCNC: 4.25 MG/DL
CREAT SPEC-SCNC: 57 MG/DL
CREAT/PROT UR: 0.4 RATIO
EOSINOPHIL # BLD AUTO: 0.35 K/UL
EOSINOPHIL NFR BLD AUTO: 3.6 %
FERRITIN SERPL-MCNC: 189 NG/ML
GLUCOSE SERPL-MCNC: 84 MG/DL
HCT VFR BLD CALC: 30.7 %
HGB BLD-MCNC: 9.3 G/DL
IMM GRANULOCYTES NFR BLD AUTO: 0.4 %
IRON SATN MFR SERPL: 20 %
IRON SERPL-MCNC: 50 UG/DL
LYMPHOCYTES # BLD AUTO: 1.68 K/UL
LYMPHOCYTES NFR BLD AUTO: 17.3 %
MAN DIFF?: NORMAL
MCHC RBC-ENTMCNC: 29.9 PG
MCHC RBC-ENTMCNC: 30.3 GM/DL
MCV RBC AUTO: 98.7 FL
MONOCYTES # BLD AUTO: 0.74 K/UL
MONOCYTES NFR BLD AUTO: 7.6 %
NEUTROPHILS # BLD AUTO: 6.85 K/UL
NEUTROPHILS NFR BLD AUTO: 70.4 %
PARATHYROID HORMONE INTACT: 207 PG/ML
PHOSPHATE SERPL-MCNC: 4.4 MG/DL
PLATELET # BLD AUTO: 224 K/UL
POTASSIUM SERPL-SCNC: 5.4 MMOL/L
PROT SERPL-MCNC: 6.7 G/DL
PROT UR-MCNC: 22 MG/DL
RBC # BLD: 3.11 M/UL
RBC # FLD: 13.7 %
SODIUM ?TM SUB UR QN: 78 MMOL/L
SODIUM SERPL-SCNC: 140 MMOL/L
TIBC SERPL-MCNC: 250 UG/DL
UIBC SERPL-MCNC: 199 UG/DL
WBC # FLD AUTO: 9.73 K/UL

## 2021-03-12 ENCOUNTER — APPOINTMENT (OUTPATIENT)
Dept: HEMATOLOGY ONCOLOGY | Facility: CLINIC | Age: 86
End: 2021-03-12

## 2021-04-05 ENCOUNTER — APPOINTMENT (OUTPATIENT)
Dept: CARDIOLOGY | Facility: CLINIC | Age: 86
End: 2021-04-05
Payer: MEDICARE

## 2021-04-05 ENCOUNTER — NON-APPOINTMENT (OUTPATIENT)
Age: 86
End: 2021-04-05

## 2021-04-05 VITALS
DIASTOLIC BLOOD PRESSURE: 70 MMHG | WEIGHT: 185 LBS | SYSTOLIC BLOOD PRESSURE: 142 MMHG | HEART RATE: 82 BPM | BODY MASS INDEX: 26.48 KG/M2 | RESPIRATION RATE: 13 BRPM | OXYGEN SATURATION: 96 % | TEMPERATURE: 97.8 F | HEIGHT: 70 IN

## 2021-04-05 DIAGNOSIS — D69.6 THROMBOCYTOPENIA, UNSPECIFIED: ICD-10-CM

## 2021-04-05 PROCEDURE — 93000 ELECTROCARDIOGRAM COMPLETE: CPT

## 2021-04-05 PROCEDURE — 99072 ADDL SUPL MATRL&STAF TM PHE: CPT

## 2021-04-05 PROCEDURE — 99215 OFFICE O/P EST HI 40 MIN: CPT

## 2021-04-09 NOTE — HISTORY OF PRESENT ILLNESS
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  (meditech reviewed)\par He went to [facility]  ER twice and [place] er for epistaxis in February. \par He then had a syncopal episode,  was take to [facility] found to be in complete heart block - pacemaker placed  9/2020. (records reviewed)\par \par He denies chest pain, dyspnea,palpitations or syncope. he has dyspnea on exertion with stairs.\par he walks with a cane "for security"  he can walk to his car and back without difficulty.\par He has coronary calcification on CT 7/30/20.\par

## 2021-04-12 ENCOUNTER — APPOINTMENT (OUTPATIENT)
Dept: NEPHROLOGY | Facility: CLINIC | Age: 86
End: 2021-04-12
Payer: MEDICARE

## 2021-05-13 ENCOUNTER — APPOINTMENT (OUTPATIENT)
Dept: CARDIOLOGY | Facility: CLINIC | Age: 86
End: 2021-05-13
Payer: MEDICARE

## 2021-05-13 VITALS
SYSTOLIC BLOOD PRESSURE: 150 MMHG | BODY MASS INDEX: 26.48 KG/M2 | HEART RATE: 78 BPM | TEMPERATURE: 97.8 F | OXYGEN SATURATION: 96 % | HEIGHT: 70 IN | DIASTOLIC BLOOD PRESSURE: 70 MMHG | WEIGHT: 185 LBS

## 2021-05-13 PROCEDURE — 93280 PM DEVICE PROGR EVAL DUAL: CPT

## 2021-05-13 PROCEDURE — 99072 ADDL SUPL MATRL&STAF TM PHE: CPT

## 2021-05-13 NOTE — PROCEDURE
[Pacemaker] : pacemaker [Medtronic] : Medtronic [No] : not [Complete Heart Block] : complete heart block [DDDR] : DDDR [None] : none [Counters Reset] : the counters were reset [de-identified] : Rhona GONZALES DR MRI SureScan [id number] [de-identified] : [id number] [de-identified] : 9/23/20 [de-identified] : 60/120 [de-identified] : 12.2 years [de-identified] : Normal device function. No events.

## 2021-05-13 NOTE — REVIEW OF SYSTEMS
[Fever] : no fever [Headache] : no headache [Chills] : no chills [Feeling Fatigued] : not feeling fatigued [SOB] : no shortness of breath [Chest Discomfort] : no chest discomfort [Lower Ext Edema] : no extremity edema [Palpitations] : no palpitations [Syncope] : no syncope [Cough] : no cough [Confusion] : no confusion was observed [Easy Bleeding] : no tendency for easy bleeding [Easy Bruising] : a tendency for easy bruising

## 2021-05-13 NOTE — DISCUSSION/SUMMARY
[Pacemaker Function Normal] : normal pacemaker function [Patient] : the patient [FreeTextEntry1] : Follow up with Dr. Bazzi and device interrogation on 11/11/21 as scheduled.

## 2021-05-13 NOTE — HISTORY OF PRESENT ILLNESS
[de-identified] : \par 88 year old male with high degree AV block s/p PPM on 9/24/2020, hypertension, dyslipidemia, coronary artery calcification on CT, asbestosis, RUL squamous cell carcinoma s/p right upper posterior segmentectomy, Stage IV CKD.\par \par He was admitted to [facility] from 4/8/21 to 4/10/21 with c/o nausea and diarrhea. Found to have +UTI and +C diff colitis. He was treated with antibiotics. Post op course significant for YUE in setting of CKD and Lasix was discontinued. He also had an acute gout flare up for which he was treated with colchicine. \par \par He reports feeling well since discharge. Had a gout flare up this morning. He took a colchicine tab. Mr. Phillips denies chest pain, SOB, palpitations, dizziness or syncope.

## 2021-05-13 NOTE — PHYSICAL EXAM
[Normal Appearance] : normal appearance [Well Groomed] : well groomed [General Appearance - In No Acute Distress] : no acute distress [Heart Rate And Rhythm] : heart rate and rhythm were normal [Heart Sounds] : normal S1 and S2 [Edema] : no peripheral edema present [] : no respiratory distress [Auscultation Breath Sounds / Voice Sounds] : lungs were clear to auscultation bilaterally [Respiration, Rhythm And Depth] : normal respiratory rhythm and effort

## 2021-06-07 ENCOUNTER — APPOINTMENT (OUTPATIENT)
Dept: NEPHROLOGY | Facility: CLINIC | Age: 86
End: 2021-06-07
Payer: MEDICARE

## 2021-06-07 VITALS
TEMPERATURE: 98 F | RESPIRATION RATE: 16 BRPM | DIASTOLIC BLOOD PRESSURE: 84 MMHG | OXYGEN SATURATION: 96 % | BODY MASS INDEX: 26.48 KG/M2 | HEART RATE: 64 BPM | SYSTOLIC BLOOD PRESSURE: 162 MMHG | WEIGHT: 185 LBS | HEIGHT: 70 IN

## 2021-06-07 PROCEDURE — 99072 ADDL SUPL MATRL&STAF TM PHE: CPT

## 2021-06-07 PROCEDURE — 99214 OFFICE O/P EST MOD 30 MIN: CPT

## 2021-06-07 NOTE — PHYSICAL EXAM
[General Appearance - Alert] : alert [General Appearance - In No Acute Distress] : in no acute distress [Sclera] : the sclera and conjunctiva were normal [Extraocular Movements] : extraocular movements were intact [Neck Appearance] : the appearance of the neck was normal [] : no respiratory distress [Respiration, Rhythm And Depth] : normal respiratory rhythm and effort [Heart Rate And Rhythm] : heart rate was normal and rhythm regular [Heart Sounds] : normal S1 and S2 [Heart Sounds Gallop] : no gallops [Murmurs] : no murmurs [Heart Sounds Pericardial Friction Rub] : no pericardial rub [Bowel Sounds] : normal bowel sounds [Abdomen Soft] : soft [Abdomen Tenderness] : non-tender [Involuntary Movements] : no involuntary movements were seen [Skin Color & Pigmentation] : normal skin color and pigmentation [No Focal Deficits] : no focal deficits [FreeTextEntry1] : no asterixis or clonus [Oriented To Time, Place, And Person] : oriented to person, place, and time [Impaired Insight] : insight and judgment were intact [Affect] : the affect was normal [Mood] : the mood was normal

## 2021-06-07 NOTE — REVIEW OF SYSTEMS
[Loss Of Hearing] : hearing loss [Cough] : no cough [Hesitancy] : no urinary hesitancy [Negative] : Heme/Lymph [FreeTextEntry2] : Intentional lost weight 233 pounds --> 173 --> 178 -->  185 over the past 25 months.  Has been eating well. [FreeTextEntry3] : foreign body removal from eye (1976), lens implant (1996) [FreeTextEntry4] : this is not new [FreeTextEntry6] : occasional dyspnea with exertion [FreeTextEntry8] : nocturia x 1.  No urinary problems.   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  Takes no over the counter medications. [de-identified] : occasional left foot numbness (began following surgery for sciatic nerve issues) [de-identified] : feels cold most of the time

## 2021-06-07 NOTE — ASSESSMENT
[FreeTextEntry1] : Laurent Phillips is an 88-year old male with a RUL squamous cell cancer s/p partial lobectomy (08/2020), poorly controlled hypertension with recent improved control, and an elevated serum creatinine consistent with  stage IV chronic kidney disease who I am seeing regarding his renal disease.  He underwent lung surgery in August 2020 and then PPM placement for complete heart block in late September 2020.  He is feeling well.  He reports his last measured systolic pressure was under 140 mm Hg. \par \par Mr. Phillips's BUN/creatinine levels have trended as follows:  47/2.11 (01/25/2019),  68/2.5 (02/21/2020), 80/2.7 (02/23/2020), 58/2.69 (05/04/2020), 51/3.80 (08/24/2020), 69/2.81 (09/16/2020), 85/3.70 (10/01/2020),74/2.99 (10/15/2020),  70/2.8 (11/04/2020),94/4.10 (02/16/2021), 91/4.25 (02/26/2021), and most recently 55/2.8 (04/15/2021),  55/2.93 (05/03/2021).  \par \par Mr. Phillips has multiple YUE events; they followed placement of the PPM, following a nasal hemorrhage, and following the admission for C. Diff. Tox colitis and sepsis.  The renal function appears to have improved. \par \par The serum carbon dioxide is low at 18 meq per liter.  The serum potassium is slightly elevated at 5.1 meq per liter. \par \par The last urine protein to creatinine ratio was 0.4 grams per gram (normal is 0.0-0.2).\par \par The renal ultrasound of 06/08/2020 demonstrated a 9.4 cm right kidney and 10 cm left kidney with diffuse bilateral increased renal cortical parenchymal echogenicity and cortical parenchymal thinning.  The Doppler evaluation demonstrated grossly patent bilateral renal arterial and venous flow. \par \par \par IMPRESSION:\par \par (1) Stage IV CKD.  The serum creatinine from January 2019 was 2.11 mg/dL and has since progressed.  I am uncertain why though suspect that hypertension has been a significant contributor.    \par \par (2) YUE.  There have been multiple events. \par \par (3) Hypertension.  Today's blood pressure is above goal.  Blood pressures at home have been above goal. \par \par (4) Anemia.  I suspect there is a renal component here.  I have no recent values. \par \par (5) Low vitamin D.  The 25 hydroxy vitamin D level was 18.1 ng/dL  Mr. Phillips was started on vitamin D3 3000 IU once daily.  He developed diarrhea and decreased the dose. \par \par (6) Secondary hyperparathyroidism.  The last  iPTH I have from a prior visit was  207 pg/mL.  This was likely related to the renal function and vitamin D deficiency\par \par (7) Lung malignancy.\par \par (8) Hyperkalemia.  This is mild. \par \par RECOMMENDATIONS:\par \par (1) Continue oral iron (iron sulfate 325 mg once daily)\par \par (2) Mr. Phillips will try taking vitamin D3 1000 IU twice daily.  If he develops diarrhea he will return to once daily.\par \par (3)  Avoid an ACE inhibitor or ARB in this gentleman with stage IV CKD and prior hyperkalemia. Limit potassium in the diet.  I think Mr. Phillips is taking Kayexalate (he is not sure).  \par \par (4) Ideally I would be adding sodium bicarbonate to correct the metabolic acidosis and add a component of renal protection though hesitate to given the elevated blood pressures\par \par (5) Increase the nifedipine ER to 30 mg twice daily.\par \par (6) We talked about how to protect the kidneys:\par  -Good blood pressure control\par -Avoid the use of NSAIDS (ibuprofen, Motrin, Aleve,Celebrex, etc.).  Okay to use Tylenol.\par -Avoid salt 'like the plague'.  Limit sodium intake.  Do not add salt to your food.\par -Limit the intake of animal products.  Avoid high animal protein diets.\par -Stay well hydrated.\par -Good cholesterol control.\par \par (7) Mr. Phillips will return to me in September 2021 with the lab studies noted below.  \par \par (8) We talked about how the above issues are important as Mr. Phillips might face the need to make a choice about dialysis in his life time during our last meeting.  I will readdress it in September 2021. \par \par

## 2021-06-07 NOTE — HISTORY OF PRESENT ILLNESS
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 He was sent to the [facility] ED, had his blood pressures better controlled and he was discharged home.  During a second pre operative clearance his blood pressures were around 220/105.  He went directly home. While at [facility] (on 05/04/2020) he was noted to have BUN/creatinine levels of 58/2.69.  He was referred to me by Dr. Elle Hong. \par \par Mr. Phillips was taking metoprolol succinate 50 mg once daily when he presented to [facility] on 05/04/2020.  He was discharged on the same medication.  During his second visit for clearance for the surgery he was started on amlodipine 5 mg PO twice daily.  \par \par Mr. Phillips had a partial lobectomy in August 2020.  \par \par Mr. Phillips was admitted to [facility] in September 2020  after a fall with head trauma.  He had complete heart block.  A permanent pacemaker was placed.  His medical regimen was modified.   The nifedipine ER was incorrectly increased from 30 to 60 mg once daily.  Mr. Phillips reports that this was supposed to be for another patient with the same name.  He became ill, and went back to the 30 mg dose.  \par \par Mr. Phillips was admitted to [facility] in April 2021 after being sent in by Dr. Guadarrama for evaluation of severe diarrhea followed by the development of nausea and vomiting.  He met sepsis criteria.  He was found to have YUE and a UTI (Klebsiella)..  In addition he was treated for C. Diff. Colitis.  The hospitalization was complicated by an acute gout flare.  Ultimately he was discharged on oral cefuroxime, Culturelle, oral vancomycin, and colchicine.\par \par Mr. Phillips is here for follow up.  He reports that his systolic blood pressures were running around 120 mm Hg prior to the hospital admission.  Recently they have been around 150 mm Hg.  He feels "good" and is without complaints.  His gout is controlled. \par \par \par

## 2021-09-09 ENCOUNTER — LABORATORY RESULT (OUTPATIENT)
Age: 86
End: 2021-09-09

## 2021-09-13 ENCOUNTER — APPOINTMENT (OUTPATIENT)
Dept: NEPHROLOGY | Facility: CLINIC | Age: 86
End: 2021-09-13
Payer: MEDICARE

## 2021-09-13 VITALS
TEMPERATURE: 98 F | RESPIRATION RATE: 16 BRPM | OXYGEN SATURATION: 98 % | BODY MASS INDEX: 26.48 KG/M2 | DIASTOLIC BLOOD PRESSURE: 70 MMHG | HEART RATE: 68 BPM | HEIGHT: 70 IN | WEIGHT: 185 LBS | SYSTOLIC BLOOD PRESSURE: 156 MMHG

## 2021-09-13 DIAGNOSIS — D64.9 ANEMIA, UNSPECIFIED: ICD-10-CM

## 2021-09-13 PROCEDURE — 99214 OFFICE O/P EST MOD 30 MIN: CPT

## 2021-09-13 NOTE — REVIEW OF SYSTEMS
[Loss Of Hearing] : hearing loss [Cough] : no cough [Hesitancy] : no urinary hesitancy [Negative] : Heme/Lymph [FreeTextEntry2] : Intentional lost weight 233 pounds --> 173 --> 178 -->  185 over the past 2.5 years.  His weight has remained stable since his last meeting with me.  [FreeTextEntry3] : foreign body removal from eye (1976), lens implant (1996) [FreeTextEntry4] : this is not new [FreeTextEntry6] : occasional dyspnea with exertion [FreeTextEntry7] : stools are dark in color.  He takes oral iron.  [FreeTextEntry8] : nocturia x 1.  No urinary problems.   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  He says, "That's the old it is".   Takes no over the counter medications. [de-identified] : occasional left foot numbness (began following surgery for sciatic nerve issues)

## 2021-09-13 NOTE — HISTORY OF PRESENT ILLNESS
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 He was sent to the [facility] ED, had his blood pressures better controlled and he was discharged home.  During a second pre operative clearance his blood pressures were around 220/105.  He went directly home. While at [facility] (on 05/04/2020) he was noted to have BUN/creatinine levels of 58/2.69.  He was referred to me by Dr. Elle Hong. \par \par Mr. Phillips had a partial lobectomy in August 2020.  \par \par Mr. Phillips was admitted to [facility] in September 2020  after a fall with head trauma.  He had complete heart block.  A permanent pacemaker was placed.   \par \par Mr. Phillips was admitted to [facility] in April 2021 after being sent in by Dr. Guadarrama for evaluation of severe diarrhea followed by the development of nausea and vomiting.  He met sepsis criteria.  He was found to have YUE and a UTI (Klebsiella)..  In addition he was treated for C. Diff. Colitis.  The hospitalization was complicated by an acute gout flare.  Ultimately he was discharged on oral cefuroxime, Culturelle, oral vancomycin, and colchicine.\par \par Mr. Phillips is here for follow up.  He has remained healthy since our last meeting on 06/07/2021.  He last measured his blood pressure less than 1 week ago.  His systolic pressure was 147 mm Hg. He feels "good" and is without complaints.  His gout is controlled. \par \par \par

## 2021-09-13 NOTE — PHYSICAL EXAM
[General Appearance - Alert] : alert [General Appearance - In No Acute Distress] : in no acute distress [Sclera] : the sclera and conjunctiva were normal [Extraocular Movements] : extraocular movements were intact [Neck Appearance] : the appearance of the neck was normal [] : no respiratory distress [Respiration, Rhythm And Depth] : normal respiratory rhythm and effort [Heart Rate And Rhythm] : heart rate was normal and rhythm regular [Heart Sounds] : normal S1 and S2 [Heart Sounds Gallop] : no gallops [Murmurs] : no murmurs [Heart Sounds Pericardial Friction Rub] : no pericardial rub [Edema] : there was no peripheral edema [Bowel Sounds] : normal bowel sounds [Abdomen Soft] : soft [Abdomen Tenderness] : non-tender [Involuntary Movements] : no involuntary movements were seen [Skin Color & Pigmentation] : normal skin color and pigmentation [No Focal Deficits] : no focal deficits [FreeTextEntry1] : no asterixis or clonus [Oriented To Time, Place, And Person] : oriented to person, place, and time [Impaired Insight] : insight and judgment were intact [Affect] : the affect was normal [Mood] : the mood was normal

## 2021-09-13 NOTE — ASSESSMENT
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  He underwent lung surgery in August 2020 and then PPM placement for complete heart block in late September 2020.  He is feeling well.  He reports his last measured systolic pressure at home was 147 mm Hg.  His blood pressure is elevated today in the office.  On repeat (by MD) it was without significant change. \par \par Mr. Phillips's BUN/creatinine levels have trended as follows:  47/2.11 (01/25/2019),  68/2.5 (02/21/2020), 80/2.7 (02/23/2020), 58/2.69 (05/04/2020), 51/3.80 (08/24/2020), 69/2.81 (09/16/2020), 85/3.70 (10/01/2020),74/2.99 (10/15/2020),  70/2.8 (11/04/2020),94/4.10 (02/16/2021), 91/4.25 (02/26/2021), 55/2.8 (04/15/2021),  55/2.93 (05/03/2021), and most recently 69/3.37 (08/19/2021),  61/3.08 (09/09/2021). \par \par Mr. Phillips has had multiple YUE events; they followed placement of the PPM,  a nasal hemorrhage, and an episode of   C. Diff. Tox colitis and sepsis. \par \par The renal function has worsened slightly since the last visit with me though appears to have improved slightly related to the lab studies of August 2021.   This may be related to the warm weath.   The serum carbon dioxide is low at 17 meq liter.  The serum potassium is slightly elevated at 5.4 meq per liter.  The serum phosphorous is is controlled at 3.9 g/dL.  \par \par The renal ultrasound of 06/08/2020 demonstrated a 9.4 cm right kidney and 10 cm left kidney with diffuse bilateral increased renal cortical parenchymal echogenicity and cortical parenchymal thinning.  The Doppler evaluation demonstrated grossly patent bilateral renal arterial and venous flow. \par \par \par IMPRESSION:\par \par (1) Stage IV CKD.  The serum creatinine from January 2019 was 2.11 mg/dL and has since progressed.  I am uncertain why though suspect that hypertension has been a significant contributor.    \par \par (2) Hypertension.  Today's blood pressure is above goal. \par \par (3) Anemia.  I suspect there is a renal component here.  The iron studies and ferritin are appropriate.  \par \par (4) Low vitamin D.  The 25 hydroxy vitamin D level is 46.2  ng/dL and is normal now. \par \par (5) Secondary hyperparathyroidism.  The last  iPTH I have from a prior visit was  207 pg/mL. The recent one is 132 pg/mL.    It has improved. \par \par (6) Lung malignancy.  This has resolved. Mr. Phillips reports he is free of lung cance.r \par \par (7) Hyperkalemia.  This is mild. \par \par RECOMMENDATIONS:\par \par (1) Continue oral iron (iron sulfate 325 mg once daily)\par \par (2) Continue to take vitamin D.\par \par (3)  Avoid an ACE inhibitor or ARB in this gentleman with stage IV CKD and prior hyperkalemia. Limit potassium in the diet.   We talked about high potassium foods. \par \par (4) Ideally I would be adding sodium bicarbonate to correct the metabolic acidosis and add a component of renal protection though hesitate to given the elevated blood pressures.\par \par (5) Mr. Phillips will check his blood pressures daily and call me in 1 week. I am considering increasing his metoprolol dose.\par \par (6) We talked about how to protect the kidneys:\par  -Good blood pressure control\par -Avoid the use of NSAIDS (ibuprofen, Motrin, Aleve,Celebrex, etc.).  Okay to use Tylenol.\par -Avoid salt 'like the plague'.  Limit sodium intake.  Do not add salt to your food.\par -Limit the intake of animal products.  Avoid high animal protein diets.\par -Stay well hydrated.\par -Good cholesterol control.\par \par (7) Mr. Phillips will return to me in December or January 2021 with the lab studies below. \par \par (8) We talked about how the above issues are important as Mr. Phillips might face the need to make a choice about dialysis.  He is considering it.   \par \par

## 2021-10-02 LAB
25(OH)D3 SERPL-MCNC: 46.2 NG/ML
ALBUMIN SERPL ELPH-MCNC: 4.2 G/DL
ALP BLD-CCNC: 149 U/L
ALT SERPL-CCNC: 12 U/L
ANION GAP SERPL CALC-SCNC: 13 MMOL/L
APPEARANCE: CLEAR
AST SERPL-CCNC: 17 U/L
BASOPHILS # BLD AUTO: 0.08 K/UL
BASOPHILS NFR BLD AUTO: 0.9 %
BILIRUB SERPL-MCNC: 0.2 MG/DL
BILIRUBIN URINE: NEGATIVE
BLOOD URINE: NEGATIVE
BUN SERPL-MCNC: 61 MG/DL
CALCIUM SERPL-MCNC: 8.9 MG/DL
CALCIUM SERPL-MCNC: 8.9 MG/DL
CHLORIDE SERPL-SCNC: 110 MMOL/L
CO2 SERPL-SCNC: 17 MMOL/L
COLOR: NORMAL
CREAT SERPL-MCNC: 3.08 MG/DL
CREAT SPEC-SCNC: 78 MG/DL
EOSINOPHIL # BLD AUTO: 0.75 K/UL
EOSINOPHIL NFR BLD AUTO: 8.4 %
FERRITIN SERPL-MCNC: 118 NG/ML
GLUCOSE QUALITATIVE U: NEGATIVE
GLUCOSE SERPL-MCNC: 90 MG/DL
HCT VFR BLD CALC: 35.5 %
HGB BLD-MCNC: 11.2 G/DL
IMM GRANULOCYTES NFR BLD AUTO: 0.4 %
IRON SATN MFR SERPL: 28 %
IRON SERPL-MCNC: 76 UG/DL
KETONES URINE: NEGATIVE
LEUKOCYTE ESTERASE URINE: NEGATIVE
LYMPHOCYTES # BLD AUTO: 1.97 K/UL
LYMPHOCYTES NFR BLD AUTO: 21.9 %
MAN DIFF?: NORMAL
MCHC RBC-ENTMCNC: 30.1 PG
MCHC RBC-ENTMCNC: 31.5 GM/DL
MCV RBC AUTO: 95.4 FL
MICROALBUMIN 24H UR DL<=1MG/L-MCNC: 16.5 MG/DL
MICROALBUMIN/CREAT 24H UR-RTO: 210 MG/G
MONOCYTES # BLD AUTO: 0.9 K/UL
MONOCYTES NFR BLD AUTO: 10 %
NEUTROPHILS # BLD AUTO: 5.24 K/UL
NEUTROPHILS NFR BLD AUTO: 58.4 %
NITRITE URINE: NEGATIVE
PARATHYROID HORMONE INTACT: 132 PG/ML
PH URINE: 5
PHOSPHATE SERPL-MCNC: 3.9 MG/DL
PLATELET # BLD AUTO: 157 K/UL
POTASSIUM SERPL-SCNC: 5.4 MMOL/L
PROT SERPL-MCNC: 7 G/DL
PROTEIN URINE: ABNORMAL
RBC # BLD: 3.72 M/UL
RBC # FLD: 15.6 %
SODIUM SERPL-SCNC: 141 MMOL/L
SPECIFIC GRAVITY URINE: 1.01
TIBC SERPL-MCNC: 272 UG/DL
UIBC SERPL-MCNC: 197 UG/DL
UROBILINOGEN URINE: NORMAL
WBC # FLD AUTO: 8.98 K/UL

## 2021-11-11 ENCOUNTER — APPOINTMENT (OUTPATIENT)
Dept: CARDIOLOGY | Facility: CLINIC | Age: 86
End: 2021-11-11
Payer: MEDICARE

## 2021-11-11 ENCOUNTER — NON-APPOINTMENT (OUTPATIENT)
Age: 86
End: 2021-11-11

## 2021-11-11 VITALS
WEIGHT: 192 LBS | DIASTOLIC BLOOD PRESSURE: 76 MMHG | TEMPERATURE: 98 F | BODY MASS INDEX: 27.49 KG/M2 | HEIGHT: 70 IN | HEART RATE: 68 BPM | SYSTOLIC BLOOD PRESSURE: 154 MMHG

## 2021-11-11 DIAGNOSIS — C34.12 MALIGNANT NEOPLASM OF UPPER LOBE, LEFT BRONCHUS OR LUNG: ICD-10-CM

## 2021-11-11 PROCEDURE — 93000 ELECTROCARDIOGRAM COMPLETE: CPT | Mod: 59

## 2021-11-11 PROCEDURE — 93280 PM DEVICE PROGR EVAL DUAL: CPT

## 2021-11-11 PROCEDURE — 99214 OFFICE O/P EST MOD 30 MIN: CPT

## 2021-11-11 RX ORDER — SIMVASTATIN 40 MG/1
40 TABLET, FILM COATED ORAL
Qty: 90 | Refills: 3 | Status: DISCONTINUED | COMMUNITY
Start: 2020-10-21 | End: 2021-11-11

## 2021-12-13 ENCOUNTER — APPOINTMENT (OUTPATIENT)
Dept: NEPHROLOGY | Facility: CLINIC | Age: 86
End: 2021-12-13
Payer: MEDICARE

## 2021-12-13 VITALS — SYSTOLIC BLOOD PRESSURE: 160 MMHG | DIASTOLIC BLOOD PRESSURE: 82 MMHG

## 2021-12-13 VITALS
OXYGEN SATURATION: 97 % | SYSTOLIC BLOOD PRESSURE: 162 MMHG | HEART RATE: 90 BPM | BODY MASS INDEX: 26.77 KG/M2 | HEIGHT: 70 IN | RESPIRATION RATE: 17 BRPM | DIASTOLIC BLOOD PRESSURE: 84 MMHG | TEMPERATURE: 98 F | WEIGHT: 187 LBS

## 2021-12-13 DIAGNOSIS — R79.89 OTHER SPECIFIED ABNORMAL FINDINGS OF BLOOD CHEMISTRY: ICD-10-CM

## 2021-12-13 DIAGNOSIS — E21.3 HYPERPARATHYROIDISM, UNSPECIFIED: ICD-10-CM

## 2021-12-13 PROCEDURE — 99214 OFFICE O/P EST MOD 30 MIN: CPT

## 2021-12-13 RX ORDER — NIFEDIPINE 60 MG/1
60 TABLET, FILM COATED, EXTENDED RELEASE ORAL DAILY
Refills: 0 | Status: DISCONTINUED | COMMUNITY
End: 2021-12-13

## 2021-12-13 RX ORDER — NIFEDIPINE 60 MG/1
60 TABLET, EXTENDED RELEASE ORAL DAILY
Refills: 0 | Status: DISCONTINUED | COMMUNITY
End: 2021-12-13

## 2021-12-13 NOTE — ASSESSMENT
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  He underwent lung surgery in August 2020 and then PPM placement for complete heart block in late September 2020.  He is feeling well.   His blood pressure is elevated today in the office.  On repeat (by MD) it was without significant change. \par \par Mr. Phillips's BUN/creatinine levels have trended as follows:  47/2.11 (01/25/2019),  68/2.5 (02/21/2020), 80/2.7 (02/23/2020), 58/2.69 (05/04/2020), 51/3.80 (08/24/2020), 69/2.81 (09/16/2020), 85/3.70 (10/01/2020),74/2.99 (10/15/2020),  70/2.8 (11/04/2020),94/4.10 (02/16/2021), 91/4.25 (02/26/2021), 55/2.8 (04/15/2021),  55/2.93 (05/03/2021), 69/3.37 (08/19/2021),  61/3.08 (09/09/2021), and most recently 68/3.63 (12/09/2021).\par \par Mr. Phillips has had multiple YUE events; they followed placement of the PPM,  a nasal hemorrhage, and an episode of  C. Diff. Tox colitis and sepsis. \par \par The serum creatinine has been trending upward.  His serum carbon dioxide concentration is 16 meq per liter.  The serum potassium is mildly elevated at 5.4 meq per liter.  Mr. Phillips reports he has been staying well hydrated.  \par \par The renal ultrasound of 06/08/2020 demonstrated a 9.4 cm right kidney and 10 cm left kidney with diffuse bilateral increased renal cortical parenchymal echogenicity and cortical parenchymal thinning.  The Doppler evaluation demonstrated grossly patent bilateral renal arterial and venous flow. \par \par \par IMPRESSION:\par \par (1) Stage IV CKD.  The serum creatinine from January 2019 was 2.11 mg/dL and has since progressed.  I am uncertain why though suspect that hypertension has been a significant contributor.    \par \par (2) Hypertension.  Today's blood pressure is above goal. \par \par (3) Anemia.  I suspect there is a renal component here.  The iron studies and ferritin are appropriate.  \par \par (4) Low vitamin D.  The 25 hydroxy vitamin D level is 46.2  ng/dL and is normal now. \par \par (5) Secondary hyperparathyroidism.  The last  iPTH improved from 207 pg/mL to 149 pg/mL.  \par \par (6) Lung malignancy.  This has resolved. Mr. Phillips reports he is free of lung cancer.\par \par (7) Hyperkalemia.  This is mild. \par \par RECOMMENDATIONS:\par \par (1) Continue oral iron (iron sulfate 325 mg once daily)\par \par (2) Continue to take vitamin D.\par \par (3)  Avoid an ACE inhibitor or ARB in this gentleman with stage IV CKD and prior hyperkalemia. Limit potassium in the diet.   We talked about high potassium foods. \par \par (4) I asked Mr. Phillips to increase his metoprolol succinate from 50 mg daily to 75 mg daily. \par \par (5) Stay well hydrated. \par \par (6) Ideally I would be adding sodium bicarbonate to correct the metabolic acidosis and add a component of renal protection though hesitate to given the elevated blood pressures.\par \par (7) Mr. Phillips will check his blood pressures daily and call me in 1 week. \par \par (8) We talked about how to protect the kidneys:\par  -Good blood pressure control\par -Avoid the use of NSAIDS (ibuprofen, Motrin, Aleve,Celebrex, etc.).  Okay to use Tylenol.\par -Avoid salt 'like the plague'.  Limit sodium intake.  Do not add salt to your food.\par -Limit the intake of animal products.  Avoid high animal protein diets.\par -Stay well hydrated.\par -Good cholesterol control.\par \par (9) We talked about how the above issues are important as Mr. Phillips might face the need to make a choice about dialysis.  He is considering it.   \par \par

## 2021-12-13 NOTE — HISTORY OF PRESENT ILLNESS
[FreeTextEntry1] : Laurent Phillips is an 88-year old male who was scheduled for a RUL lobectomy for a squamous cell cancer at [facility] which was cancelled because of severe hypertension (/110) noted during a pre operative exam. He was sent to the [facility] ED, had his blood pressures better controlled and he was discharged home.  During a second pre operative clearance his blood pressures were around 220/105.  He went directly home. While at [facility] (on 05/04/2020) he was noted to have BUN/creatinine levels of 58/2.69.  He was referred to me by Dr. Elle Hong.  I have followed him since. \par \par Mr. Phillips had a partial lobectomy in August 2020.  \par \par Mr. Phillips was admitted to [facility] in September 2020  after a fall with head trauma.  He had complete heart block.  A permanent pacemaker was placed.   \par \par Mr. Phillips was admitted to [facility] in April 2021 after being sent in by Dr. Guadarrama for evaluation of severe diarrhea followed by the development of nausea and vomiting.  He met sepsis criteria.  He was found to have YUE and a UTI (Klebsiella).  In addition he was treated for C. Diff. Colitis.  The hospitalization was complicated by an acute gout flare.  Ultimately he was discharged on oral cefuroxime, Culturelle, oral vancomycin, and colchicine.\par \par I last saw Mr. Phillips on 09/13/2021.  He has remained healthy since that time.  Mr. Phillips states, "I feel good".    The gout is under control.  His full ROS is below. \par \par \par

## 2021-12-13 NOTE — CONSULT LETTER
[Dear  ___] : Dear  [unfilled], [Consult Letter:] : I had the pleasure of evaluating your patient, [unfilled]. [Please see my note below.] : Please see my note below. [Consult Closing:] : Thank you very much for allowing me to participate in the care of this patient.  If you have any questions, please do not hesitate to contact me. [Sincerely,] : Sincerely, [FreeTextEntry3] : Kenton Doherty [DrJazmín  ___] : Dr. RIVAS

## 2021-12-13 NOTE — PHYSICAL EXAM
[General Appearance - Alert] : alert [Sclera] : the sclera and conjunctiva were normal [General Appearance - In No Acute Distress] : in no acute distress [Extraocular Movements] : extraocular movements were intact [Neck Appearance] : the appearance of the neck was normal [] : no respiratory distress [Respiration, Rhythm And Depth] : normal respiratory rhythm and effort [Exaggerated Use Of Accessory Muscles For Inspiration] : no accessory muscle use [Auscultation Breath Sounds / Voice Sounds] : lungs were clear to auscultation bilaterally [Heart Rate And Rhythm] : heart rate was normal and rhythm regular [Heart Sounds] : normal S1 and S2 [Heart Sounds Gallop] : no gallops [Murmurs] : no murmurs [Heart Sounds Pericardial Friction Rub] : no pericardial rub [Bowel Sounds] : normal bowel sounds [Abdomen Soft] : soft [Abdomen Tenderness] : non-tender [Involuntary Movements] : no involuntary movements were seen [Skin Color & Pigmentation] : normal skin color and pigmentation [No Focal Deficits] : no focal deficits [Oriented To Time, Place, And Person] : oriented to person, place, and time [FreeTextEntry1] : no asterixis or clonus [Impaired Insight] : insight and judgment were intact [Affect] : the affect was normal [Mood] : the mood was normal

## 2021-12-13 NOTE — REVIEW OF SYSTEMS
[Loss Of Hearing] : hearing loss [Cough] : no cough [Hesitancy] : no urinary hesitancy [Negative] : Heme/Lymph [FreeTextEntry2] : Intentional lost weight 233 pounds --> 173 --> 178 -->  185 --> 187 over the past 2.5 years.  His weight has remained stable since his last meeting with me.  [FreeTextEntry3] : foreign body removal from eye (1976), lens implant (1996) [FreeTextEntry4] : this is not new [FreeTextEntry6] : occasional dyspnea when climbing stairs- this is not new.  It has been present since the lung surgery. [FreeTextEntry7] : stools are dark in color.  He takes oral iron.  [FreeTextEntry8] : nocturia x 1.  No urinary problems.   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   Takes no over the counter medications. [de-identified] : occasional left foot numbness (began following surgery for sciatic nerve issues)- occurs "once in a while....but not all the time"

## 2021-12-19 LAB
25(OH)D3 SERPL-MCNC: 44 NG/ML
ALBUMIN SERPL ELPH-MCNC: 4.4 G/DL
ALP BLD-CCNC: 169 U/L
ALT SERPL-CCNC: 11 U/L
ANION GAP SERPL CALC-SCNC: 12 MMOL/L
AST SERPL-CCNC: 13 U/L
BASOPHILS # BLD AUTO: 0.1 K/UL
BASOPHILS NFR BLD AUTO: 1.1 %
BILIRUB SERPL-MCNC: 0.2 MG/DL
BUN SERPL-MCNC: 68 MG/DL
CALCIUM SERPL-MCNC: 8.8 MG/DL
CALCIUM SERPL-MCNC: 8.8 MG/DL
CHLORIDE SERPL-SCNC: 112 MMOL/L
CO2 SERPL-SCNC: 16 MMOL/L
CREAT SERPL-MCNC: 3.63 MG/DL
EOSINOPHIL # BLD AUTO: 0.65 K/UL
EOSINOPHIL NFR BLD AUTO: 7.2 %
FERRITIN SERPL-MCNC: 158 NG/ML
GLUCOSE SERPL-MCNC: 86 MG/DL
HCT VFR BLD CALC: 39.5 %
HGB BLD-MCNC: 12.4 G/DL
IMM GRANULOCYTES NFR BLD AUTO: 0.4 %
IRON SATN MFR SERPL: 29 %
IRON SERPL-MCNC: 83 UG/DL
LYMPHOCYTES # BLD AUTO: 1.84 K/UL
LYMPHOCYTES NFR BLD AUTO: 20.4 %
MAN DIFF?: NORMAL
MCHC RBC-ENTMCNC: 30.2 PG
MCHC RBC-ENTMCNC: 31.4 GM/DL
MCV RBC AUTO: 96.3 FL
MONOCYTES # BLD AUTO: 0.87 K/UL
MONOCYTES NFR BLD AUTO: 9.6 %
NEUTROPHILS # BLD AUTO: 5.54 K/UL
NEUTROPHILS NFR BLD AUTO: 61.3 %
PARATHYROID HORMONE INTACT: 149 PG/ML
PHOSPHATE SERPL-MCNC: 4.4 MG/DL
PLATELET # BLD AUTO: 205 K/UL
POTASSIUM SERPL-SCNC: 5.4 MMOL/L
PROT SERPL-MCNC: 7.5 G/DL
RBC # BLD: 4.1 M/UL
RBC # FLD: 14.6 %
SODIUM SERPL-SCNC: 140 MMOL/L
TIBC SERPL-MCNC: 283 UG/DL
UIBC SERPL-MCNC: 200 UG/DL
WBC # FLD AUTO: 9.04 K/UL

## 2022-03-28 ENCOUNTER — APPOINTMENT (OUTPATIENT)
Dept: NEPHROLOGY | Facility: CLINIC | Age: 87
End: 2022-03-28

## 2022-05-12 ENCOUNTER — APPOINTMENT (OUTPATIENT)
Dept: CARDIOLOGY | Facility: CLINIC | Age: 87
End: 2022-05-12

## 2022-06-28 ENCOUNTER — NON-APPOINTMENT (OUTPATIENT)
Age: 87
End: 2022-06-28

## 2022-06-29 ENCOUNTER — APPOINTMENT (OUTPATIENT)
Dept: CARDIOLOGY | Facility: CLINIC | Age: 87
End: 2022-06-29
Payer: MEDICARE

## 2022-06-29 ENCOUNTER — NON-APPOINTMENT (OUTPATIENT)
Age: 87
End: 2022-06-29

## 2022-06-29 VITALS
BODY MASS INDEX: 26.05 KG/M2 | OXYGEN SATURATION: 96 % | WEIGHT: 182 LBS | HEIGHT: 70 IN | SYSTOLIC BLOOD PRESSURE: 125 MMHG | DIASTOLIC BLOOD PRESSURE: 70 MMHG | HEART RATE: 77 BPM

## 2022-06-29 PROCEDURE — 93000 ELECTROCARDIOGRAM COMPLETE: CPT

## 2022-06-29 PROCEDURE — 99214 OFFICE O/P EST MOD 30 MIN: CPT

## 2022-06-29 NOTE — HISTORY OF PRESENT ILLNESS
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  (meditech reviewed)\par He went to [facility]  ER twice and [place] er for epistaxis in February. \par He then had a syncopal episode,  was take to [facility] found to be in complete heart block - pacemaker placed  9/2020. (records reviewed)\par \par He started dialysis this year.\par He had an er visit for urinary retention, treated with a catheter. Ras.\par \par He denies chest pain, dyspnea,palpitations or syncope. he has dyspnea on exertion with stairs.\par he walks with a cane "for security"  he can walk to his car and back without difficulty.\par He has coronary calcification on CT 7/30/20.\par

## 2022-07-07 ENCOUNTER — APPOINTMENT (OUTPATIENT)
Dept: VASCULAR SURGERY | Facility: CLINIC | Age: 87
End: 2022-07-07

## 2022-07-07 VITALS — DIASTOLIC BLOOD PRESSURE: 81 MMHG | SYSTOLIC BLOOD PRESSURE: 137 MMHG | HEART RATE: 85 BPM

## 2022-07-07 PROCEDURE — 99203 OFFICE O/P NEW LOW 30 MIN: CPT

## 2022-07-07 NOTE — PHYSICAL EXAM
[Normal Breath Sounds] : Normal breath sounds [2+] : left 2+ [Alert] : alert [Oriented to Person] : oriented to person [Oriented to Place] : oriented to place [Calm] : calm [de-identified] : NAD [de-identified] : NCAT [de-identified] : supple [FreeTextEntry1] : No upper extremity edema\par Right chest wall PermCath in place\par Left upper chest pacemaker in place [de-identified] : soft, nt, nd

## 2022-07-07 NOTE — HISTORY OF PRESENT ILLNESS
[FreeTextEntry1] : 89-year-old male with end-stage renal failure on dialysis referred by Dr. Park for evaluation for dialysis access creation.\par He is right-hand dominant, he was previously followed for CKD, progressed to ESRD in January of this year.  He is currently undergoing dialysis via right chest wall PermCath, was initially 3 times a week, now undergoing dialysis twice a week.  He does have a left sided pacing device, which was inserted approximately 3 years ago.  He denies upper extremity DVT or SVT.  He denies upper extremity edema.

## 2022-07-14 ENCOUNTER — APPOINTMENT (OUTPATIENT)
Dept: VASCULAR SURGERY | Facility: CLINIC | Age: 87
End: 2022-07-14

## 2022-07-14 PROCEDURE — 99214 OFFICE O/P EST MOD 30 MIN: CPT

## 2022-07-14 PROCEDURE — 93970 EXTREMITY STUDY: CPT

## 2022-08-27 ENCOUNTER — RESULT REVIEW (OUTPATIENT)
Age: 87
End: 2022-08-27

## 2022-08-30 ENCOUNTER — APPOINTMENT (OUTPATIENT)
Dept: VASCULAR SURGERY | Facility: HOSPITAL | Age: 87
End: 2022-08-30

## 2022-08-31 ENCOUNTER — APPOINTMENT (OUTPATIENT)
Dept: CARDIOLOGY | Facility: CLINIC | Age: 87
End: 2022-08-31

## 2022-09-06 ENCOUNTER — TRANSCRIPTION ENCOUNTER (OUTPATIENT)
Age: 87
End: 2022-09-06

## 2022-09-22 ENCOUNTER — APPOINTMENT (OUTPATIENT)
Dept: CARDIOLOGY | Facility: CLINIC | Age: 87
End: 2022-09-22

## 2022-09-22 ENCOUNTER — APPOINTMENT (OUTPATIENT)
Dept: VASCULAR SURGERY | Facility: CLINIC | Age: 87
End: 2022-09-22

## 2022-09-22 VITALS — DIASTOLIC BLOOD PRESSURE: 74 MMHG | SYSTOLIC BLOOD PRESSURE: 148 MMHG | HEART RATE: 89 BPM

## 2022-09-22 DIAGNOSIS — Z87.39 PERSONAL HISTORY OF OTHER DISEASES OF THE MUSCULOSKELETAL SYSTEM AND CONNECTIVE TISSUE: ICD-10-CM

## 2022-09-22 PROCEDURE — 93280 PM DEVICE PROGR EVAL DUAL: CPT

## 2022-09-22 PROCEDURE — 99024 POSTOP FOLLOW-UP VISIT: CPT

## 2022-09-22 RX ORDER — GLUC/MSM/COLGN2/HYAL/ANTIARTH3 375-375-20
TABLET ORAL DAILY
Refills: 0 | Status: DISCONTINUED | COMMUNITY
End: 2022-09-22

## 2022-09-22 RX ORDER — ALLOPURINOL 100 MG/1
100 TABLET ORAL
Qty: 90 | Refills: 1 | Status: ACTIVE | COMMUNITY

## 2022-09-22 RX ORDER — METOPROLOL SUCCINATE 50 MG/1
50 TABLET, EXTENDED RELEASE ORAL DAILY
Qty: 90 | Refills: 3 | Status: ACTIVE | COMMUNITY
Start: 2021-04-05

## 2022-09-22 RX ORDER — TAMSULOSIN HYDROCHLORIDE 0.4 MG/1
0.4 CAPSULE ORAL
Refills: 0 | Status: ACTIVE | COMMUNITY
Start: 2022-09-22

## 2022-09-22 RX ORDER — CALCIUM CARBONATE 500 MG/1
500 TABLET, CHEWABLE ORAL
Refills: 0 | Status: ACTIVE | COMMUNITY
Start: 2022-09-22

## 2022-09-22 NOTE — DISCUSSION/SUMMARY
[FreeTextEntry1] : Approximately 3-week status post right radiocephalic AV fistula creation.  Incision well-healed, and feels well, no edema, good thrill.  Continues to undergo dialysis via right chest wall catheter twice a week.\par \par Continue to allow maturation, follow-up in 6 weeks, then will reexamine and possibly obtain dialysis access ultrasound, will decide if he requires balloon maturation.

## 2022-09-22 NOTE — PHYSICAL EXAM
[2+] : right 2+ [Calm] : calm [de-identified] : NAD [FreeTextEntry1] : Palpable thrill in right radiocephalic AV fistula [de-identified] : Right wrist incision well-healed

## 2022-09-22 NOTE — REASON FOR VISIT
[de-identified] : Right radiocephalic AV fistula [de-identified] : 8/30/22 [de-identified] : Approximately 3-week status post right radiocephalic AV fistula creation.  Incision well-healed, and feels well, no edema, good thrill.  Continues to undergo dialysis via right chest wall catheter twice a week. [Family Member] : family member

## 2022-09-22 NOTE — PHYSICAL EXAM
[Normal Appearance] : normal appearance [Well Groomed] : well groomed [General Appearance - In No Acute Distress] : no acute distress [Heart Rate And Rhythm] : heart rate and rhythm were normal [Heart Sounds] : normal S1 and S2 [Edema] : no peripheral edema present [] : no respiratory distress [Auscultation Breath Sounds / Voice Sounds] : lungs were clear to auscultation bilaterally

## 2022-09-22 NOTE — REVIEW OF SYSTEMS
[SOB] : no shortness of breath [Chest Discomfort] : no chest discomfort [Lower Ext Edema] : no extremity edema [Palpitations] : no palpitations [Syncope] : no syncope

## 2022-09-22 NOTE — PROCEDURE
[Pacemaker] : pacemaker [Medtronic] : Medtronic [DDDR] : DDDR [No] : not [NSR] : normal sinus rhythm [Threshold Testing Performed] : Threshold testing was performed [None] : none [Counters Reset] : the counters were reset [de-identified] : Rhona GONZALES DR MRI SureScan W1DR01 [de-identified] : [id number] [de-identified] : 9/23/20 [de-identified] : 60/120 [de-identified] : 10.7 years [de-identified] : Normal device function. 3 brief NSVT with longest lasting 3 seconds

## 2022-09-22 NOTE — DISCUSSION/SUMMARY
[Pacemaker Function Normal] : normal pacemaker function [Patient] : the patient [de-identified] : Device interrogation in 6 months. Continue remote monitoring

## 2022-09-22 NOTE — HISTORY OF PRESENT ILLNESS
[de-identified] : \par 89 year old male with high degree AV block s/p PPM on 9/24/2020, hypertension, dyslipidemia, coronary artery calcification on CT, asbestosis, RUL squamous cell carcinoma s/p right upper posterior segmentectomy, ESRD on HD via right chest wall HD catheter twice a week. Right wrist fistula with dressing intact.\par \par Mr. Phillips denies chest pain, SOB, palpitations, dizziness or syncope.

## 2022-09-25 NOTE — PROCEDURE
[FreeTextEntry1] : Bilateral upper extremity vein mapping performed\par Left forearm cephalic vein seems to be adequate, upper arm cephalic vein is poor in caliber\par Right arm cephalic vein in the forearm and upper arm is suitable in size for AV fistula creation

## 2022-09-25 NOTE — HISTORY OF PRESENT ILLNESS
[FreeTextEntry1] : 89-year-old male with end-stage renal failure on dialysis referred by Dr. Park for evaluation for dialysis access creation.\par He is right-hand dominant, he was previously followed for CKD, progressed to ESRD in January of this year.  He is currently undergoing dialysis via right chest wall PermCath, was initially 3 times a week, now undergoing dialysis twice a week.  He does have a left sided pacing device, which was inserted approximately 3 years ago.  He denies upper extremity DVT or SVT.  He denies upper extremity edema. [de-identified] : 7/14/22 - He was dialysis via PermCath, twice daily.  Returns for a mapping and discussion of dialysis access surgery.

## 2022-09-25 NOTE — PHYSICAL EXAM
[Normal Breath Sounds] : Normal breath sounds [2+] : left 2+ [Alert] : alert [Oriented to Person] : oriented to person [Oriented to Place] : oriented to place [Calm] : calm [de-identified] : NAD [de-identified] : NCAT [de-identified] : supple [FreeTextEntry1] : No upper extremity edema\par Right chest wall PermCath in place\par Left upper chest pacemaker in place [de-identified] : soft, nt, nd

## 2022-09-25 NOTE — ASSESSMENT
[FreeTextEntry1] : 89-year-old male, right-hand-dominant, now with ESRD on dialysis via right chest wall PermCath, in need of permanent dialysis access.\par I discussed with the patient and his son creation of AV fistula versus AV graft, timeframe for maturation of AV fistula, and potential need for balloon assisted maturation.  \par We did obtain vein mapping and his right arm cephalic vein, forearm and upper arm, seems adequate in size for creation of AV fistula.  I discussed these findings with him, as well as the fact that he has a left-sided pacer.\par We will proceed with right forearm fistula creation.  We discussed risks and benefits, short and long-term outcomes.  He wishes to proceed.

## 2022-10-27 ENCOUNTER — APPOINTMENT (OUTPATIENT)
Dept: VASCULAR SURGERY | Facility: CLINIC | Age: 87
End: 2022-10-27

## 2022-10-27 VITALS — HEART RATE: 87 BPM | DIASTOLIC BLOOD PRESSURE: 70 MMHG | SYSTOLIC BLOOD PRESSURE: 112 MMHG

## 2022-10-27 PROCEDURE — 99024 POSTOP FOLLOW-UP VISIT: CPT

## 2022-10-27 NOTE — PHYSICAL EXAM
[2+] : right 2+ [Calm] : calm [de-identified] : NAD [FreeTextEntry1] : Palpable thrill in right radiocephalic AV fistula, thrill softens as fistula travels up the forearm [de-identified] : Right wrist incision well-healed

## 2022-10-27 NOTE — REASON FOR VISIT
[de-identified] : Right radiocephalic AV fistula [de-identified] : 8/30/22 [de-identified] : Approximately 8-weeks status post right radiocephalic AV fistula creation.  Incision well-healed, and feels well, no edema.  Continues to undergo dialysis via right chest wall catheter twice a week. [Family Member] : family member

## 2022-10-27 NOTE — DISCUSSION/SUMMARY
[FreeTextEntry1] : Approximately 8-week status post right radiocephalic AV fistula creation.  Incision well-healed, and feels well, no edema, good thrill.  Continues to undergo dialysis via right chest wall catheter twice a week.\par \par The fistula has a palpable thrill, which softens as it travels up the forearm.  We will obtain a dialysis access duplex, and decide if he should undergo balloon maturation and branch ligation versus coiling, or if it is ready for small needle cannulation.

## 2022-11-02 ENCOUNTER — APPOINTMENT (OUTPATIENT)
Dept: CARDIOLOGY | Facility: CLINIC | Age: 87
End: 2022-11-02

## 2022-11-02 PROCEDURE — 93306 TTE W/DOPPLER COMPLETE: CPT

## 2022-11-17 ENCOUNTER — APPOINTMENT (OUTPATIENT)
Dept: VASCULAR SURGERY | Facility: CLINIC | Age: 87
End: 2022-11-17

## 2022-11-17 VITALS — BODY MASS INDEX: 25.91 KG/M2 | HEIGHT: 70 IN | WEIGHT: 181 LBS

## 2022-11-17 PROCEDURE — 93990 DOPPLER FLOW TESTING: CPT

## 2022-11-17 PROCEDURE — 99024 POSTOP FOLLOW-UP VISIT: CPT

## 2022-11-17 NOTE — REASON FOR VISIT
[de-identified] : Right radiocephalic AV fistula [de-identified] : 8/30/22 [de-identified] : Approximately 11-weeks status post right radiocephalic AV fistula creation.  Incision well-healed, and feels well, no edema.  Continues to undergo dialysis via right chest wall catheter twice a week. [Family Member] : family member

## 2022-11-17 NOTE — PHYSICAL EXAM
[2+] : right 2+ [Calm] : calm [de-identified] : NAD [FreeTextEntry1] : Palpable thrill in right radiocephalic AV fistula, thrill softens as fistula travels up the forearm [de-identified] : Right wrist incision well-healed

## 2022-11-17 NOTE — DISCUSSION/SUMMARY
[FreeTextEntry1] : Approximately 11-week status post right radiocephalic AV fistula creation.  Incision well-healed, and feels well, no edema, good thrill.  Continues to undergo dialysis via right chest wall catheter twice a week.\par \par The fistula has a palpable thrill, which softens as it travels up the forearm.  We obtained a dialysis access duplex which suggests stenosis in the midportion of the AV fistula, with thrill softens.  Will benefit from a fistulogram and fistuloplasty to allow maturation.  We discussed conduct of the procedure, risk, benefits.  This discussion was had with the patient as well as his son who was present for the visit.  He wishes to proceed.\par We will schedule right arm fistulogram in the next 1 to 2 weeks.  He will obtain medical clearance.

## 2022-12-04 ENCOUNTER — RESULT REVIEW (OUTPATIENT)
Age: 87
End: 2022-12-04

## 2022-12-07 ENCOUNTER — APPOINTMENT (OUTPATIENT)
Dept: VASCULAR SURGERY | Facility: HOSPITAL | Age: 87
End: 2022-12-07

## 2022-12-07 ENCOUNTER — TRANSCRIPTION ENCOUNTER (OUTPATIENT)
Age: 87
End: 2022-12-07

## 2023-01-02 NOTE — REASON FOR VISIT
(M6) obeys commands [Initial Evaluation] : an initial evaluation of [Hyperlipidemia] : hyperlipidemia [Hypertension] : hypertension [Pacemaker Evaluation] : pacemaker ~T evaluation ~C was performed [FreeTextEntry2] : 9 month

## 2023-01-05 ENCOUNTER — APPOINTMENT (OUTPATIENT)
Dept: VASCULAR SURGERY | Facility: CLINIC | Age: 88
End: 2023-01-05
Payer: MEDICARE

## 2023-01-05 VITALS — WEIGHT: 181 LBS | HEIGHT: 70 IN | BODY MASS INDEX: 25.91 KG/M2

## 2023-01-05 PROCEDURE — 99213 OFFICE O/P EST LOW 20 MIN: CPT

## 2023-01-05 NOTE — REASON FOR VISIT
[Family Member] : family member [de-identified] : Right radiocephalic AV fistula [de-identified] : 8/30/22 [de-identified] : Approximately 11-weeks status post right radiocephalic AV fistula creation.  Incision well-healed, and feels well, no edema.  Continues to undergo dialysis via right chest wall catheter twice a week. [Follow-Up: _____] : a [unfilled] follow-up visit

## 2023-01-05 NOTE — REASON FOR VISIT
[Family Member] : family member [de-identified] : Right radiocephalic AV fistula [de-identified] : 8/30/22 [de-identified] : Approximately 11-weeks status post right radiocephalic AV fistula creation.  Incision well-healed, and feels well, no edema.  Continues to undergo dialysis via right chest wall catheter twice a week. [Follow-Up: _____] : a [unfilled] follow-up visit

## 2023-01-10 ENCOUNTER — NON-APPOINTMENT (OUTPATIENT)
Age: 88
End: 2023-01-10

## 2023-01-11 ENCOUNTER — APPOINTMENT (OUTPATIENT)
Dept: CARDIOLOGY | Facility: CLINIC | Age: 88
End: 2023-01-11
Payer: MEDICARE

## 2023-01-11 ENCOUNTER — NON-APPOINTMENT (OUTPATIENT)
Age: 88
End: 2023-01-11

## 2023-01-11 VITALS
RESPIRATION RATE: 18 BRPM | BODY MASS INDEX: 26.39 KG/M2 | SYSTOLIC BLOOD PRESSURE: 118 MMHG | TEMPERATURE: 97.7 F | WEIGHT: 184.31 LBS | HEART RATE: 84 BPM | OXYGEN SATURATION: 96 % | HEIGHT: 70 IN | DIASTOLIC BLOOD PRESSURE: 86 MMHG

## 2023-01-11 DIAGNOSIS — N18.4 CHRONIC KIDNEY DISEASE, STAGE 4 (SEVERE): ICD-10-CM

## 2023-01-11 PROCEDURE — 99215 OFFICE O/P EST HI 40 MIN: CPT | Mod: 25

## 2023-01-11 PROCEDURE — 93000 ELECTROCARDIOGRAM COMPLETE: CPT

## 2023-01-11 NOTE — HISTORY OF PRESENT ILLNESS
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  (meditech reviewed)\par He went to [facility]  ER twice and [place] er for epistaxis in February. \par He then had a syncopal episode,  was take to [facility] found to be in complete heart block - pacemaker placed  9/2020. (records reviewed)\par \par He started dialysis this year. Twice a week. [place]. He had a fistula placed since last visit.\par \par He denies chest pain, dyspnea,palpitations or syncope. he has dyspnea on exertion with stairs.\par he walks with a cane "for security"  he can walk to his car and back without difficulty.\par He has coronary calcification on CT 7/30/20.\par

## 2023-01-21 ENCOUNTER — RX RENEWAL (OUTPATIENT)
Age: 88
End: 2023-01-21

## 2023-02-02 ENCOUNTER — APPOINTMENT (OUTPATIENT)
Dept: VASCULAR SURGERY | Facility: CLINIC | Age: 88
End: 2023-02-02
Payer: MEDICARE

## 2023-02-02 VITALS — HEIGHT: 70 IN | BODY MASS INDEX: 26.05 KG/M2 | WEIGHT: 182 LBS

## 2023-02-02 PROCEDURE — 36589 REMOVAL TUNNELED CV CATH: CPT

## 2023-02-02 NOTE — ADDENDUM
[FreeTextEntry1] : Catheter exit site dressing instructions given to the patient and his son good understanding.\par Will continue dialysis via the AV fistula, will continue to follow with me in 3 months.

## 2023-02-02 NOTE — PROCEDURE
[FreeTextEntry1] : Removal of right chest wall tunneled dialysis catheter [FreeTextEntry2] : ESRD on HD, functional AV fistula, PC no longer needed [FreeTextEntry3] : Informed consent was obtained.  The patient was placed on the exam table in the supine position.  The right neck, upper chest wall, and PermCath was prepped and draped in the usual sterile fashion.  Lidocaine was infiltrated surrounding the cuff of the PermCath.  The cuff was dissected circumferentially using scissors and mosquito clamp.  The PermCath was removed in its entirety.  Manual pressure was held over the right jugular vein entry site for 15 minutes.  Catheter exit site was dressed with bacitracin and a gauze dressing.  Patient tolerated procedure well with no issues.  He was monitored in the office and discharged uneventfully.

## 2023-03-22 NOTE — PHYSICAL EXAM
[de-identified] : Right wrist incision well-healed [Normal Breath Sounds] : Normal breath sounds [2+] : left 2+ [Alert] : alert [Oriented to Person] : oriented to person [Oriented to Place] : oriented to place [Calm] : calm [de-identified] : NAD [de-identified] : NCAT [de-identified] : supple [FreeTextEntry1] : Palpable thrill in right radiocephalic AV fistula\par \par No upper extremity edema\par Right chest wall PermCath in place\par Left upper chest pacemaker in place [de-identified] : soft, nt, nd

## 2023-03-22 NOTE — HISTORY OF PRESENT ILLNESS
[FreeTextEntry1] : 89-year-old male with end-stage renal failure on dialysis referred by Dr. Park for evaluation for dialysis access creation.\par He is right-hand dominant, he was previously followed for CKD, progressed to ESRD in January of this year.  He is currently undergoing dialysis via right chest wall PermCath, was initially 3 times a week, now undergoing dialysis twice a week.  He does have a left sided pacing device, which was inserted approximately 3 years ago.  He denies upper extremity DVT or SVT.  He denies upper extremity edema. [de-identified] : 7/14/22 - He was dialysis via PermCath, twice daily.  Returns for a mapping and discussion of dialysis access surgery.\par \par 1/5/23 - He is 6-week status post right arm fistulogram, fistuloplasty and coil embolization of a proximal draining branch.  He has a nice palpable thrill is an AV fistula currently.  He continues to undergo dialysis 2 times a week, Monday and Wednesday, via right chest wall PermCath.

## 2023-03-22 NOTE — PHYSICAL EXAM
[de-identified] : Right wrist incision well-healed [Normal Breath Sounds] : Normal breath sounds [2+] : left 2+ [Alert] : alert [Oriented to Person] : oriented to person [Oriented to Place] : oriented to place [Calm] : calm [de-identified] : NAD [de-identified] : NCAT [de-identified] : supple [FreeTextEntry1] : Palpable thrill in right radiocephalic AV fistula\par \par No upper extremity edema\par Right chest wall PermCath in place\par Left upper chest pacemaker in place [de-identified] : soft, nt, nd

## 2023-03-22 NOTE — ASSESSMENT
[FreeTextEntry1] : 89-year-old male with end-stage renal failure on dialysis who underwent a right radiocephalic AV fistula creation approximately 4 months ago, he is now status post fistulogram, fistuloplasty, coil immunization approximately 6 weeks ago.  His fistula has a nice palpable thrill.  I will discuss with Dr. Park about accessing the fistula for dialysis, and if this is successful he will return for PermCath removal in 3 to 4 weeks.

## 2023-03-22 NOTE — HISTORY OF PRESENT ILLNESS
[FreeTextEntry1] : 89-year-old male with end-stage renal failure on dialysis referred by Dr. Park for evaluation for dialysis access creation.\par He is right-hand dominant, he was previously followed for CKD, progressed to ESRD in January of this year.  He is currently undergoing dialysis via right chest wall PermCath, was initially 3 times a week, now undergoing dialysis twice a week.  He does have a left sided pacing device, which was inserted approximately 3 years ago.  He denies upper extremity DVT or SVT.  He denies upper extremity edema. [de-identified] : 7/14/22 - He was dialysis via PermCath, twice daily.  Returns for a mapping and discussion of dialysis access surgery.\par \par 1/5/23 - He is 6-week status post right arm fistulogram, fistuloplasty and coil embolization of a proximal draining branch.  He has a nice palpable thrill is an AV fistula currently.  He continues to undergo dialysis 2 times a week, Monday and Wednesday, via right chest wall PermCath.

## 2023-04-13 ENCOUNTER — APPOINTMENT (OUTPATIENT)
Dept: VASCULAR SURGERY | Facility: CLINIC | Age: 88
End: 2023-04-13
Payer: MEDICARE

## 2023-04-13 VITALS — SYSTOLIC BLOOD PRESSURE: 183 MMHG | DIASTOLIC BLOOD PRESSURE: 81 MMHG | HEART RATE: 80 BPM

## 2023-04-13 PROCEDURE — 99213 OFFICE O/P EST LOW 20 MIN: CPT

## 2023-05-10 NOTE — HISTORY OF PRESENT ILLNESS
[FreeTextEntry1] : 89-year-old male with end-stage renal failure on dialysis referred by Dr. Park for evaluation for dialysis access creation.\par He is right-hand dominant, he was previously followed for CKD, progressed to ESRD in January of this year.  He is currently undergoing dialysis via right chest wall PermCath, was initially 3 times a week, now undergoing dialysis twice a week.  He does have a left sided pacing device, which was inserted approximately 3 years ago.  He denies upper extremity DVT or SVT.  He denies upper extremity edema. [de-identified] : 7/14/22 - He was dialysis via PermCath, twice daily.  Returns for a mapping and discussion of dialysis access surgery.\par \par 1/5/23 - He is 6-week status post right arm fistulogram, fistuloplasty and coil embolization of a proximal draining branch.  He has a nice palpable thrill is an AV fistula currently.  He continues to undergo dialysis 2 times a week, Monday and Wednesday, via right chest wall PermCath.\par \par 4/13/23 - The patient is 10 weeks status-post the removal of his right chest wall tunneled dialysis catheter. The patient is doing well.  He continues to undergo HD via his AVF without issues.

## 2023-05-10 NOTE — PHYSICAL EXAM
[Normal Breath Sounds] : Normal breath sounds [2+] : left 2+ [Alert] : alert [Oriented to Person] : oriented to person [Oriented to Place] : oriented to place [Calm] : calm [Oriented to Time] : oriented to time [de-identified] : NAD [de-identified] : NCAT [de-identified] : supple [FreeTextEntry1] : Palpable thrill in right radiocephalic AV fistula\par \par No upper extremity edema\par Right chest wall PermCath in place\par Left upper chest pacemaker in place [de-identified] : soft, nt, nd

## 2023-05-10 NOTE — ASSESSMENT
[FreeTextEntry1] : 89-year-old male with end-stage renal failure on dialysis who underwent a right radiocephalic AV fistula creation approximately 4 months ago, he is now status post fistulogram, fistuloplasty, coil immunization approximately 6 weeks ago.  His fistula has a nice palpable thrill.  I will discuss with Dr. Park about accessing the fistula for dialysis, and if this is successful he will return for PermCath removal in 3 to 4 weeks.\par \par On follow up, patient is s/p the removal of his right chest wall tunneled dialysis catheter 10 weeks ago. He continues to undergo HD via his right upper extremity AVF without issues. His fistula has a palpable thrill on physical exam. He will follow up in 3 months or sooner if he develops issues with his fistula.

## 2023-06-30 ENCOUNTER — APPOINTMENT (OUTPATIENT)
Dept: CARDIOLOGY | Facility: CLINIC | Age: 88
End: 2023-06-30

## 2023-07-13 ENCOUNTER — APPOINTMENT (OUTPATIENT)
Dept: VASCULAR SURGERY | Facility: CLINIC | Age: 88
End: 2023-07-13
Payer: MEDICARE

## 2023-07-13 VITALS — BODY MASS INDEX: 27.77 KG/M2 | WEIGHT: 194 LBS | HEIGHT: 70 IN

## 2023-07-13 PROCEDURE — 99213 OFFICE O/P EST LOW 20 MIN: CPT

## 2023-07-13 RX ORDER — SEVELAMER CARBONATE 800 MG/1
800 TABLET, FILM COATED ORAL
Qty: 270 | Refills: 0 | Status: DISCONTINUED | COMMUNITY
Start: 2022-05-13 | End: 2023-07-13

## 2023-07-13 NOTE — PHYSICAL EXAM
[Normal Breath Sounds] : Normal breath sounds [2+] : left 2+ [Alert] : alert [Oriented to Person] : oriented to person [Oriented to Place] : oriented to place [Oriented to Time] : oriented to time [Calm] : calm [de-identified] : NAD [de-identified] : NCAT [de-identified] : supple [FreeTextEntry1] : Palpable thrill in right radiocephalic AV fistula\par \par No upper extremity edema\par Right chest wall PermCath in place\par Left upper chest pacemaker in place [de-identified] : soft, nt, nd

## 2023-07-13 NOTE — HISTORY OF PRESENT ILLNESS
[FreeTextEntry1] : 89-year-old male with end-stage renal failure on dialysis referred by Dr. Park for evaluation for dialysis access creation.\par He is right-hand dominant, he was previously followed for CKD, progressed to ESRD in January of this year.  He is currently undergoing dialysis via right chest wall PermCath, was initially 3 times a week, now undergoing dialysis twice a week.  He does have a left sided pacing device, which was inserted approximately 3 years ago.  He denies upper extremity DVT or SVT.  He denies upper extremity edema. [de-identified] : 7/14/22 - He was dialysis via PermCath, twice daily.  Returns for a mapping and discussion of dialysis access surgery.\par \par 1/5/23 - He is 6-week status post right arm fistulogram, fistuloplasty and coil embolization of a proximal draining branch.  He has a nice palpable thrill is an AV fistula currently.  He continues to undergo dialysis 2 times a week, Monday and Wednesday, via right chest wall PermCath.\par \par 4/13/23 - The patient is 10 weeks status-post the removal of his right chest wall tunneled dialysis catheter. The patient is doing well.  He continues to undergo HD via his AVF without issues.\par \par 7/13/23 - Continues to undergo dialysis without issues.  Doing well with no complaints.

## 2023-07-13 NOTE — ASSESSMENT
[FreeTextEntry1] : 89-year-old male with end-stage renal failure on dialysis who underwent a right radiocephalic AV fistula creation approximately 4 months ago, he is now status post fistulogram, fistuloplasty, coil immunization.  His fistula has a nice palpable thrill.  \par \par On follow up, he continues to do very well and is undergoing dialysis via his AV fistula with no issues.  He will return for follow-up in 3 months for repeat evaluation, sooner if there is any problems with his fistula.

## 2023-07-24 ENCOUNTER — NON-APPOINTMENT (OUTPATIENT)
Age: 88
End: 2023-07-24

## 2023-07-26 ENCOUNTER — NON-APPOINTMENT (OUTPATIENT)
Age: 88
End: 2023-07-26

## 2023-07-26 ENCOUNTER — APPOINTMENT (OUTPATIENT)
Dept: CARDIOLOGY | Facility: CLINIC | Age: 88
End: 2023-07-26
Payer: MEDICARE

## 2023-07-26 VITALS
HEART RATE: 92 BPM | OXYGEN SATURATION: 98 % | HEIGHT: 70 IN | BODY MASS INDEX: 26.63 KG/M2 | SYSTOLIC BLOOD PRESSURE: 142 MMHG | DIASTOLIC BLOOD PRESSURE: 82 MMHG | WEIGHT: 186 LBS

## 2023-07-26 DIAGNOSIS — I45.10 UNSPECIFIED RIGHT BUNDLE-BRANCH BLOCK: ICD-10-CM

## 2023-07-26 DIAGNOSIS — I25.10 ATHEROSCLEROTIC HEART DISEASE OF NATIVE CORONARY ARTERY W/OUT ANGINA PECTORIS: ICD-10-CM

## 2023-07-26 DIAGNOSIS — I44.2 ATRIOVENTRICULAR BLOCK, COMPLETE: ICD-10-CM

## 2023-07-26 DIAGNOSIS — I77.819 AORTIC ECTASIA, UNSPECIFIED SITE: ICD-10-CM

## 2023-07-26 DIAGNOSIS — E78.5 HYPERLIPIDEMIA, UNSPECIFIED: ICD-10-CM

## 2023-07-26 DIAGNOSIS — I10 ESSENTIAL (PRIMARY) HYPERTENSION: ICD-10-CM

## 2023-07-26 PROCEDURE — 99215 OFFICE O/P EST HI 40 MIN: CPT | Mod: 25

## 2023-07-26 PROCEDURE — 93000 ELECTROCARDIOGRAM COMPLETE: CPT

## 2023-07-26 NOTE — HISTORY OF PRESENT ILLNESS
[FreeTextEntry1] : Mr Phillips has  been followed here since 2020.He has coronary calcification on CT 7/30/20. 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  \par \par He started dialysis this year. Twice a week. [facility]\par \par He denies chest pain, dyspnea,palpitations or syncope. he has dyspnea on exertion with stairs.\par he walks with a cane "for security"  he can walk to his car and back without difficulty.\par \par

## 2023-10-12 ENCOUNTER — APPOINTMENT (OUTPATIENT)
Dept: CARDIOLOGY | Facility: CLINIC | Age: 88
End: 2023-10-12
Payer: MEDICARE

## 2023-10-12 VITALS
BODY MASS INDEX: 26.34 KG/M2 | DIASTOLIC BLOOD PRESSURE: 80 MMHG | OXYGEN SATURATION: 98 % | WEIGHT: 184 LBS | HEART RATE: 71 BPM | HEIGHT: 70 IN | SYSTOLIC BLOOD PRESSURE: 126 MMHG

## 2023-10-12 DIAGNOSIS — Z95.0 PRESENCE OF CARDIAC PACEMAKER: ICD-10-CM

## 2023-10-12 PROCEDURE — 93280 PM DEVICE PROGR EVAL DUAL: CPT

## 2023-10-12 PROCEDURE — 99214 OFFICE O/P EST MOD 30 MIN: CPT | Mod: 25

## 2023-10-12 RX ORDER — ASPIRIN 81 MG
81 TABLET, DELAYED RELEASE (ENTERIC COATED) ORAL DAILY
Refills: 0 | Status: ACTIVE | COMMUNITY

## 2023-10-18 ENCOUNTER — APPOINTMENT (OUTPATIENT)
Dept: CARDIOLOGY | Facility: CLINIC | Age: 88
End: 2023-10-18
Payer: MEDICARE

## 2023-10-18 PROCEDURE — 93306 TTE W/DOPPLER COMPLETE: CPT

## 2023-10-19 ENCOUNTER — APPOINTMENT (OUTPATIENT)
Dept: VASCULAR SURGERY | Facility: CLINIC | Age: 88
End: 2023-10-19
Payer: MEDICARE

## 2023-10-19 VITALS
HEART RATE: 85 BPM | DIASTOLIC BLOOD PRESSURE: 80 MMHG | SYSTOLIC BLOOD PRESSURE: 148 MMHG | BODY MASS INDEX: 26.34 KG/M2 | HEIGHT: 70 IN | WEIGHT: 184 LBS

## 2023-10-19 DIAGNOSIS — I48.0 PAROXYSMAL ATRIAL FIBRILLATION: ICD-10-CM

## 2023-10-19 PROCEDURE — 99213 OFFICE O/P EST LOW 20 MIN: CPT

## 2023-10-19 RX ORDER — APIXABAN 2.5 MG/1
2.5 TABLET, FILM COATED ORAL
Qty: 180 | Refills: 3 | Status: ACTIVE | COMMUNITY
Start: 2023-10-19 | End: 1900-01-01

## 2024-01-18 ENCOUNTER — APPOINTMENT (OUTPATIENT)
Dept: VASCULAR SURGERY | Facility: CLINIC | Age: 89
End: 2024-01-18
Payer: MEDICARE

## 2024-01-18 VITALS
BODY MASS INDEX: 26.63 KG/M2 | SYSTOLIC BLOOD PRESSURE: 135 MMHG | WEIGHT: 186 LBS | DIASTOLIC BLOOD PRESSURE: 79 MMHG | HEART RATE: 87 BPM | HEIGHT: 70 IN

## 2024-01-18 DIAGNOSIS — N18.6 END STAGE RENAL DISEASE: ICD-10-CM

## 2024-01-18 DIAGNOSIS — Z99.2 END STAGE RENAL DISEASE: ICD-10-CM

## 2024-01-18 PROCEDURE — 99213 OFFICE O/P EST LOW 20 MIN: CPT

## 2024-01-29 ENCOUNTER — APPOINTMENT (OUTPATIENT)
Dept: CARDIOLOGY | Facility: CLINIC | Age: 89
End: 2024-01-29

## 2024-02-05 PROBLEM — N18.6 ESRD ON DIALYSIS: Status: ACTIVE | Noted: 2022-07-07

## 2024-02-05 NOTE — END OF VISIT
[FreeTextEntry3] : All medical record entries made by the [facility] were at my, NATHAN ARAMBULA, direction and personally dictated by me on 01/18/2024. I have reviewed the chart and agree that the record accurately reflects my personal performance of the history, physical exam, assessment and plan. I have also personally directed, reviewed, and agreed with the chart. [Time Spent: ___ minutes] : I have spent [unfilled] minutes of time on the encounter.

## 2024-02-05 NOTE — ASSESSMENT
[FreeTextEntry1] : 89-year-old male with end-stage renal failure on dialysis who underwent a right radiocephalic AV fistula creation approximately 4 months ago, he is now status post fistulogram, fistuloplasty, coil immunization. His fistula has a nice palpable thrill.  On follow up, the patient continues to do very well and is undergoing dialysis twice a week via his AV fistula without issues. He will return for follow-up in 6 months for repeat evaluation or sooner if he develops any problems with his fistula.

## 2024-02-05 NOTE — HISTORY OF PRESENT ILLNESS
[FreeTextEntry1] : 89-year-old male with end-stage renal failure on dialysis referred by Dr. Park for evaluation for dialysis access creation.\par  He is right-hand dominant, he was previously followed for CKD, progressed to ESRD in January of this year.  He is currently undergoing dialysis via right chest wall PermCath, was initially 3 times a week, now undergoing dialysis twice a week.  He does have a left sided pacing device, which was inserted approximately 3 years ago.  He denies upper extremity DVT or SVT.  He denies upper extremity edema. [de-identified] : 7/14/22 - He was dialysis via PermCath, twice daily.  Returns for a mapping and discussion of dialysis access surgery.  1/5/23 - He is 6-week status post right arm fistulogram, fistuloplasty and coil embolization of a proximal draining branch.  He has a nice palpable thrill is an AV fistula currently.  He continues to undergo dialysis 2 times a week, Monday and Wednesday, via right chest wall PermCath.  4/13/23 - The patient is 10 weeks status-post the removal of his right chest wall tunneled dialysis catheter. The patient is doing well.  He continues to undergo HD via his AVF without issues.  7/13/23 - Continues to undergo dialysis without issues.  Doing well with no complaints.  10/19/23 - The patient continues to do well. He continues to undergo dialysis twice a week. There are no issues accessing his fistula.  1/18/24 - Patient continues to do well on follow up. He continues to undergo dialysis twice a week without issues. He continues to ambulate with a cane.

## 2024-02-05 NOTE — ADDENDUM
[FreeTextEntry1] : Documented by Aleksandra Singh acting as a scribe for NATHAN ARAMBULA on 01/18/2024.

## 2024-02-05 NOTE — PHYSICAL EXAM
[Normal Breath Sounds] : Normal breath sounds [2+] : left 2+ [Alert] : alert [Oriented to Person] : oriented to person [Oriented to Place] : oriented to place [Oriented to Time] : oriented to time [Calm] : calm [JVD] : no jugular venous distention  [de-identified] : NAD [de-identified] : NCAT [de-identified] : supple [FreeTextEntry1] : Palpable thrill in right radiocephalic AV fistula No upper extremity edema Left upper chest pacemaker in place [de-identified] : soft, nt, nd

## 2024-04-10 ENCOUNTER — RESULT REVIEW (OUTPATIENT)
Age: 89
End: 2024-04-10

## 2024-04-12 ENCOUNTER — TRANSCRIPTION ENCOUNTER (OUTPATIENT)
Age: 89
End: 2024-04-12

## 2024-04-12 ENCOUNTER — RESULT REVIEW (OUTPATIENT)
Age: 89
End: 2024-04-12

## 2024-04-16 ENCOUNTER — RX RENEWAL (OUTPATIENT)
Age: 89
End: 2024-04-16

## 2024-04-16 RX ORDER — ROSUVASTATIN CALCIUM 20 MG/1
20 TABLET, FILM COATED ORAL
Qty: 90 | Refills: 3 | Status: ACTIVE | COMMUNITY
Start: 2021-11-11 | End: 1900-01-01

## 2024-07-16 ENCOUNTER — RESULT REVIEW (OUTPATIENT)
Age: 89
End: 2024-07-16

## 2024-07-18 ENCOUNTER — APPOINTMENT (OUTPATIENT)
Dept: VASCULAR SURGERY | Facility: CLINIC | Age: 89
End: 2024-07-18

## 2024-08-21 ENCOUNTER — RESULT REVIEW (OUTPATIENT)
Age: 89
End: 2024-08-21

## 2024-08-26 ENCOUNTER — RESULT REVIEW (OUTPATIENT)
Age: 89
End: 2024-08-26

## 2024-08-31 ENCOUNTER — TRANSCRIPTION ENCOUNTER (OUTPATIENT)
Age: 89
End: 2024-08-31

## 2024-09-01 NOTE — HISTORY OF PRESENT ILLNESS
[FreeTextEntry1] :   (Last seen 06/29/18)\par        .\par        Laurent Phillips is an 85-year-old gentleman with a history of high-grade noninvasive bladder cancer discovered at the time of workup for prostate cancer. The bladder cancer was treated on 02/08/12. Mr. Phillips's last recurrence was documented on 06/29/15. The patient did undergo extensive evaluation in October 2017. when he presented with an episode of gross hematuria.   While there was no evidence of visible tumor and cytology studies were benign FISH proved to be positive. Mr. Phillips is currently on a q6 monthly surveillance regimen.  He comes to the office today for routine cystoscopy. His last examination was performed on 06/29/18.  That study and both cytology and FISH proved to be benign.  He remains under treatment/surveillance for bilateral lung cancer. \par \par Recently, 12/17/18 Mr. Phillips underwent back surgery for chronic pain and sciatica. Results have been excellent and Mr. Phillips reports that he is pain free.\par        .\par         PERTINENT UROLOGIC HISTORY:\par        .\par        1/12/12: PROSTATE BIOPSY; 10/10 biopsies on the left 2/6 biopsies on the right positive for [place]gan 6 adenocarcinoma\par        02/08/12: TURBT(III/III) (noninvasive)\par        05/10/12: RADIATION THERAPY ends\par        01/09/12: CYSTOSCOPY WITH BIOPSY (+); focus of carcinoma in situ completely treated in the office\par        06/25/13: CYSTOSCOPY WITH BIOPSY (+); (I/III low-grade tumor completely treated in the office)\par        06/29/15: CYSTOSCOPY WITH BIOPSY (+): Small patch of carcinoma in situ completely treated in the office\par        07/17/15: CYSTOSCOPY WITH MULTIPLE RANDOM BIOPSIES(-); all biopsies negative, cytology (-); FISH (+)\par        11/06/15: CYSTOSCOPY (-)\par        04/19/16: Cystoscopy(-); cytology negative for high-grade cancer; FISH (-)\par        06/28/16: PSA 0.4 (drawn by Dr. Guadarrama)\par        01/23/17: PSA 0.30; free PSA 16%\par        01/23/17: Cystoscopy (-); Cytology positive for cellular elements with reactive changes consistent with instrumentation but also positive for a "Few cells with significant atypia (nuclear irregularity) and degenerative changes, suggesting the possibility of a urothelial neoplasm" ; FISH (+) \par        06/12/17: Cystoscopy (-); cytology (-)\par        06/12/17: PSA 0.23\par        06/16/17: CT STONE STUDY (-)\par        10/23/17: Cystoscopy (-); cytology (-); FISH (-)\par        12/13/17: CYSTOSCOPY WITH BILATERAL RETROGRADE PYELOGRAPHY (-); cytology (-); FISH (+)\par        12/19/17: PSA 0.26; free PSA of 19%\par 06/29/18:  PSA 0.28; free PSA 22%\par \par CURRENT UROLOGIC REVIEW OF SYSTEMS:\par \par Incontinence Assessed?.  YES\par \par Frequency: (-) 4  x/day    \par Nocturia: (-) 1  x/night\par Dysuria: (-)\par Urgency:  (-) \par Hesitancy:  (-)\par Intermittency:  (-)\par Sensation of Incomplete Voiding :  (-)\par Double voiding :  (-)\par Stress incontinence:  (-)\par Urge incontinence:  (-)\par Use of absorbent pads:  (-)\par Terminal dribbling:  (-)\par Status of stream. "Good"\par Venereal disease:  (-)\par Kidney stones:  (-)\par UTIs:  (-)\par Prior urologic surgery:  (+) (see HPI)\par Hematuria:  (-)\par Abdominal pressure:  (-)\par Back pain:  (-) S/P back surgery non-urologic back pain has resolved!\par Sexual Status. not active\par Gout:  (-)\par Renal problems:  (+) (blood drawn today for B-Met along with PSA\par Family History of Urologic Problems:  (-)\par International Prostate Symptom Score (IPSS): 1 (Mild 0-7)\par Satisfaction Index: 0 (delighted)
No

## 2024-09-12 ENCOUNTER — RESULT REVIEW (OUTPATIENT)
Age: 89
End: 2024-09-12

## 2024-09-12 ENCOUNTER — APPOINTMENT (OUTPATIENT)
Dept: HEMATOLOGY ONCOLOGY | Facility: CLINIC | Age: 89
End: 2024-09-12
Payer: MEDICARE

## 2024-09-12 VITALS
SYSTOLIC BLOOD PRESSURE: 113 MMHG | BODY MASS INDEX: 23.96 KG/M2 | WEIGHT: 167.38 LBS | HEART RATE: 96 BPM | OXYGEN SATURATION: 95 % | TEMPERATURE: 97 F | HEIGHT: 70 IN | RESPIRATION RATE: 16 BRPM | DIASTOLIC BLOOD PRESSURE: 71 MMHG

## 2024-09-12 DIAGNOSIS — C34.12 MALIGNANT NEOPLASM OF UPPER LOBE, LEFT BRONCHUS OR LUNG: ICD-10-CM

## 2024-09-12 DIAGNOSIS — J91.0 MALIGNANT PLEURAL EFFUSION: ICD-10-CM

## 2024-09-12 PROCEDURE — ZZZZZ: CPT

## 2024-09-12 PROCEDURE — 36415 COLL VENOUS BLD VENIPUNCTURE: CPT

## 2024-09-12 PROCEDURE — G2211 COMPLEX E/M VISIT ADD ON: CPT

## 2024-09-12 PROCEDURE — 99215 OFFICE O/P EST HI 40 MIN: CPT

## 2024-09-12 NOTE — PHYSICAL EXAM
[Ambulatory and capable of all self care but unable to carry out any work activities] : Status 2- Ambulatory and capable of all self care but unable to carry out any work activities. Up and about more than 50% of waking hours [Normal] : affect appropriate [de-identified] : walks with cane, hard of hearing b/l ears  [de-identified] : +left pleurx [de-identified] : edema of b/l lower ext

## 2024-09-12 NOTE — BEGINNING OF VISIT
[0] : 2) Feeling down, depressed, or hopeless: Not at all (0) [PHQ-2 Negative] : PHQ-2 Negative [Pain Scale: ___] : On a scale of 1-10, today the patient's pain is a(n) [unfilled]. [Former] : Former [> 15 Years] : > 15 Years [Date Discussed (MM/DD/YY): ___] : Discussed: [unfilled] [Diarrhea Character] : Diarrhea: Grade 0 [Abdominal Pain] : no abdominal pain [Vomiting] : no vomiting [Constipation] : no constipation

## 2024-09-12 NOTE — PHYSICAL EXAM
[Ambulatory and capable of all self care but unable to carry out any work activities] : Status 2- Ambulatory and capable of all self care but unable to carry out any work activities. Up and about more than 50% of waking hours [Normal] : affect appropriate [de-identified] : walks with cane, hard of hearing b/l ears  [de-identified] : +left pleurx [de-identified] : edema of b/l lower ext

## 2024-09-12 NOTE — HISTORY OF PRESENT ILLNESS
[de-identified] : Mr. Laurent Phillips is 87 year old male with recurrent R lung cancer referred by Dr. Vu for further evaluation.  Patient with medical history of renal insufficiency, htn, nocturia who was diagnosed with  prostate cancer in 2012 s/p chemoradiation, high grade noninvasive bladder cancer in 2012 during work up for prostate cancer s/p surgery, recurrence in 2015 s/p surgery with Dr. Ledesma. Patient was also found to have left lung cancer s/p left left lobectomy with Dr. Vu 2012 during further work up for bladder cancer.  Patient now with recurrence of lung cancer of right lung with scans and surgery below.  3/6/20 - Right upper lung, biopsy: Squamous cell carcinoma. PD-L1 < 1%  6/8/20 Ct of abdomen/pelvis -  No significant change in size or appearance of either kidney since prior sonogram of 9/14/2017, with diffuse increased cortical parenchymal echogenicity and cortical parenchymal thinning that are compatible with medical renal disease.  Unremarkable appearance of urinary bladder with 62 cc post void residual. 7/30/20 Ct scan -Increase in size of a multilobulated and spiculated mass at the posterior aspect of the right upper lobe that is difficult to accurately measure due to multilobulation, but the bulkiest portion of which currently measures 2 x 1.2 x 1.8 cm at lung windows, compared to 1.5 x 1.1 x 1.5 cm on 2/3/2020. There has been no change in multiple additional smaller bilateral pulmonary nodules  8/12/20  RUL posterior segmentectomy Pathology:  Pathologic Staging:  pT2**(2*) N0 -Moderate differentiated squamous cell carcinoma: size - 2.8 cm -Pleural invasion to the visceral pleural surface present and supported by elastic tissue special stain performed on 4 blocks - associated focal squamous cell carcinoma -Adenocarcinoma: size 1.3 cm.  -Types: Acinar (70%), micropapillary (20%), and papillary (10%) -Spread thorugh alveolar spaces present. -Narrowest parenchymal margin: < 1mm from tissue inked underlying the 2mm thick stapled line.  -Additional findings: atypical adenomatous hyperplasia (AAH) -negative for metasetasis in one incidental LN (0/1).  Patient with no family history of hematology and/or oncological disorders.  He was a prior smoker - smoked about a pack a day x  22 years (quit 50 yrs ago).   Patient with SOB on exertion but comfortable sitting still.  Last PSA checked two weeks ago, been normalized.   Reports end of September 2020, he passed out s/p fall nad hit his head. Was taken to [facility]. Was found to have CHB s/p PPM  Healthcare proxy - Gt Phillips  [de-identified] : HE is seen today for post hospital follow up to discuss management of hi patient was last seen in Dec 2020 Accompanied by his son Gt  Recently admitted at [facility] from 8/27/2024 - 8/31/2024 for SOB and found to have pleural effusions and ascites s/p paracentesis and thoracentesis with path positive for lung malignancy.  4/12/2024 - paracentesis - 5050 cc removed Path: ABDOMINAL FLUID DRAINAGE (INCLUDING THINPREP AND CELL BLOCK) -  NEGATIVE FOR MALIGNANT CELLS -  Scattered mesothelial cells, macrophages and a few inflammatory cells.  8/21/2024 paracentesis - 3250 cc removed  8/27/2024 - right thoracentesis - 1550 cc removed  Path: PLEURAL FLUID, LEFT, THORACENTESIS: POSITIVE FOR MALIGNANCY.  SEE COMMENT.  Overall features are most compatible with adenocarcinoma of lung origin.    PD-L1 Immunohistochemistry  Antibody: [place] PDL1 () Tissue type: lung Block: Cell block/ A1 Result: Tumor Proportion Score (TPS)*:  80%  8/27/2024 CT chest  Moderate left loculated pleural effusion with trace pneumothorax. Layering high density material within the pleural effusion may represent trace hemorrhage. Left pleural catheter in place. New enlargement of the left hilum from opacities/lymphadenopathy, extending to the chain sutures in this region. New cavitary nodule in the left upper lobe. Findings can represent a combination of infectious and neoplastic process.  8/30/2024 L pleurx catheter placement   Since he's been home, he's gradually gettiing better - on dialysis 3x/week and left thoracentesis 3/wk with 160 cc removed two days ago. (T/Th/Sat) Denies any diarrhea, constipation, bleeding, fever, chills, pain

## 2024-09-12 NOTE — HISTORY OF PRESENT ILLNESS
[de-identified] : Mr. Laurent Phillips is 87 year old male with recurrent R lung cancer referred by Dr. Vu for further evaluation.  Patient with medical history of renal insufficiency, htn, nocturia who was diagnosed with  prostate cancer in 2012 s/p chemoradiation, high grade noninvasive bladder cancer in 2012 during work up for prostate cancer s/p surgery, recurrence in 2015 s/p surgery with Dr. Ledesma. Patient was also found to have left lung cancer s/p left left lobectomy with Dr. Vu 2012 during further work up for bladder cancer.  Patient now with recurrence of lung cancer of right lung with scans and surgery below.  3/6/20 - Right upper lung, biopsy: Squamous cell carcinoma. PD-L1 < 1%  6/8/20 Ct of abdomen/pelvis -  No significant change in size or appearance of either kidney since prior sonogram of 9/14/2017, with diffuse increased cortical parenchymal echogenicity and cortical parenchymal thinning that are compatible with medical renal disease.  Unremarkable appearance of urinary bladder with 62 cc post void residual. 7/30/20 Ct scan -Increase in size of a multilobulated and spiculated mass at the posterior aspect of the right upper lobe that is difficult to accurately measure due to multilobulation, but the bulkiest portion of which currently measures 2 x 1.2 x 1.8 cm at lung windows, compared to 1.5 x 1.1 x 1.5 cm on 2/3/2020. There has been no change in multiple additional smaller bilateral pulmonary nodules  8/12/20  RUL posterior segmentectomy Pathology:  Pathologic Staging:  pT2**(2*) N0 -Moderate differentiated squamous cell carcinoma: size - 2.8 cm -Pleural invasion to the visceral pleural surface present and supported by elastic tissue special stain performed on 4 blocks - associated focal squamous cell carcinoma -Adenocarcinoma: size 1.3 cm.  -Types: Acinar (70%), micropapillary (20%), and papillary (10%) -Spread thorugh alveolar spaces present. -Narrowest parenchymal margin: < 1mm from tissue inked underlying the 2mm thick stapled line.  -Additional findings: atypical adenomatous hyperplasia (AAH) -negative for metasetasis in one incidental LN (0/1).  Patient with no family history of hematology and/or oncological disorders.  He was a prior smoker - smoked about a pack a day x  22 years (quit 50 yrs ago).   Patient with SOB on exertion but comfortable sitting still.  Last PSA checked two weeks ago, been normalized.   Reports end of September 2020, he passed out s/p fall nad hit his head. Was taken to [facility]. Was found to have CHB s/p PPM  Healthcare proxy - Gt Phillips  [de-identified] : HE is seen today for post hospital follow up to discuss management of hi patient was last seen in Dec 2020 Accompanied by his son Gt  Recently admitted at [facility] from 8/27/2024 - 8/31/2024 for SOB and found to have pleural effusions and ascites s/p paracentesis and thoracentesis with path positive for lung malignancy.  4/12/2024 - paracentesis - 5050 cc removed Path: ABDOMINAL FLUID DRAINAGE (INCLUDING THINPREP AND CELL BLOCK) -  NEGATIVE FOR MALIGNANT CELLS -  Scattered mesothelial cells, macrophages and a few inflammatory cells.  8/21/2024 paracentesis - 3250 cc removed  8/27/2024 - right thoracentesis - 1550 cc removed  Path: PLEURAL FLUID, LEFT, THORACENTESIS: POSITIVE FOR MALIGNANCY.  SEE COMMENT.  Overall features are most compatible with adenocarcinoma of lung origin.    PD-L1 Immunohistochemistry  Antibody: [place] PDL1 () Tissue type: lung Block: Cell block/ A1 Result: Tumor Proportion Score (TPS)*:  80%  8/27/2024 CT chest  Moderate left loculated pleural effusion with trace pneumothorax. Layering high density material within the pleural effusion may represent trace hemorrhage. Left pleural catheter in place. New enlargement of the left hilum from opacities/lymphadenopathy, extending to the chain sutures in this region. New cavitary nodule in the left upper lobe. Findings can represent a combination of infectious and neoplastic process.  8/30/2024 L pleurx catheter placement   Since he's been home, he's gradually gettiing better - on dialysis 3x/week and left thoracentesis 3/wk with 160 cc removed two days ago. (T/Th/Sat) Denies any diarrhea, constipation, bleeding, fever, chills, pain

## 2024-09-12 NOTE — PHYSICAL EXAM
[Ambulatory and capable of all self care but unable to carry out any work activities] : Status 2- Ambulatory and capable of all self care but unable to carry out any work activities. Up and about more than 50% of waking hours [Normal] : affect appropriate [de-identified] : walks with cane, hard of hearing b/l ears  [de-identified] : +left pleurx [de-identified] : edema of b/l lower ext  Consent (Near Eyelid Margin)/Introductory Paragraph: The rationale for Mohs was explained to the patient and consent was obtained. The risks, benefits and alternatives to therapy were discussed in detail. Specifically, the risks of ectropion or eyelid deformity, infection, scarring, bleeding, prolonged wound healing, incomplete removal, allergy to anesthesia, nerve injury and recurrence were addressed. Prior to the procedure, the treatment site was clearly identified and confirmed by the patient. All components of Universal Protocol/PAUSE Rule completed.

## 2024-09-12 NOTE — REVIEW OF SYSTEMS
[Fatigue] : fatigue [SOB on Exertion] : shortness of breath during exertion [Negative] : Allergic/Immunologic [FreeTextEntry9] : edema of b/l lower ext  [de-identified] : walks with cane

## 2024-09-12 NOTE — ASSESSMENT
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 Layering high density material within the pleural effusion may represent trace hemorrhage. Left pleural catheter in place. New enlargement of the left hilum from opacities/lymphadenopathy, extending to the chain sutures in this region. New cavitary nodule in the left upper lobe. Findings can represent a combination of infectious and neoplastic process Mri brain- cannot be obtained due to PPM  Discussed at length about the diagnosis, work up, staging, prognosis and treatment options Explained that malignant pleural effusion would qualify as stage IV disease if this was a new primary. Overall given his age, performance status, this is likely not a curable disease, however his high PDL1 makes him a good chance of having a good response with Immunotherapy I have reviewed the risks, benefits and side effects of immunotherapy with the patient including inflammation in any part of the body from head to toe including thyroiditis, hypophysitis, pneumonitis, hepatitis, colitis, myositis, myocarditis, arthritis, neuropathy, etc. All questions were answered to satisfaction. Patient agrees to pursue the planned immunotherapy. Plan: Send blood work including CBC, CMP, CEA, LDH, liquid biopsy FOllow EGFR and rest of the molecular panel If negative- plan for keytruda 200 mg Q3W, change to 400 mg Q6w after a few cycles as long as he is tolerating well  RUL lung cancer 2 Foci- squamous cell and adenocarcinoma Pathological stage pT2**(2*) N0 Visceral pleural involvement positive Opted against adjuvant treatment He is seen today for follow up No cough, sob Right apical densities- ? post op vs residual PET (12/5- NO fdg avid malignancy No interventions needed for now  Left lung cancer s/p SHONNA resection in 2012  History of bladder cancer and prostate cancer Follows with Dr Bolaños  Follow up in 2-3 weeks Can use labs from today

## 2024-09-12 NOTE — REVIEW OF SYSTEMS
[Fatigue] : fatigue [SOB on Exertion] : shortness of breath during exertion [Negative] : Allergic/Immunologic [FreeTextEntry9] : edema of b/l lower ext  [de-identified] : walks with cane

## 2024-09-12 NOTE — HISTORY OF PRESENT ILLNESS
[de-identified] : Mr. Laurent Phillips is 87 year old male with recurrent R lung cancer referred by Dr. Vu for further evaluation.  Patient with medical history of renal insufficiency, htn, nocturia who was diagnosed with  prostate cancer in 2012 s/p chemoradiation, high grade noninvasive bladder cancer in 2012 during work up for prostate cancer s/p surgery, recurrence in 2015 s/p surgery with Dr. Ledesma. Patient was also found to have left lung cancer s/p left left lobectomy with Dr. Vu 2012 during further work up for bladder cancer.  Patient now with recurrence of lung cancer of right lung with scans and surgery below.  3/6/20 - Right upper lung, biopsy: Squamous cell carcinoma. PD-L1 < 1%  6/8/20 Ct of abdomen/pelvis -  No significant change in size or appearance of either kidney since prior sonogram of 9/14/2017, with diffuse increased cortical parenchymal echogenicity and cortical parenchymal thinning that are compatible with medical renal disease.  Unremarkable appearance of urinary bladder with 62 cc post void residual. 7/30/20 Ct scan -Increase in size of a multilobulated and spiculated mass at the posterior aspect of the right upper lobe that is difficult to accurately measure due to multilobulation, but the bulkiest portion of which currently measures 2 x 1.2 x 1.8 cm at lung windows, compared to 1.5 x 1.1 x 1.5 cm on 2/3/2020. There has been no change in multiple additional smaller bilateral pulmonary nodules  8/12/20  RUL posterior segmentectomy Pathology:  Pathologic Staging:  pT2**(2*) N0 -Moderate differentiated squamous cell carcinoma: size - 2.8 cm -Pleural invasion to the visceral pleural surface present and supported by elastic tissue special stain performed on 4 blocks - associated focal squamous cell carcinoma -Adenocarcinoma: size 1.3 cm.  -Types: Acinar (70%), micropapillary (20%), and papillary (10%) -Spread thorugh alveolar spaces present. -Narrowest parenchymal margin: < 1mm from tissue inked underlying the 2mm thick stapled line.  -Additional findings: atypical adenomatous hyperplasia (AAH) -negative for metasetasis in one incidental LN (0/1).  Patient with no family history of hematology and/or oncological disorders.  He was a prior smoker - smoked about a pack a day x  22 years (quit 50 yrs ago).   Patient with SOB on exertion but comfortable sitting still.  Last PSA checked two weeks ago, been normalized.   Reports end of September 2020, he passed out s/p fall nad hit his head. Was taken to [facility]. Was found to have CHB s/p PPM  Healthcare proxy - Gt Phillips  [de-identified] : HE is seen today for post hospital follow up to discuss management of hi patient was last seen in Dec 2020 Accompanied by his son Gt  Recently admitted at [facility] from 8/27/2024 - 8/31/2024 for SOB and found to have pleural effusions and ascites s/p paracentesis and thoracentesis with path positive for lung malignancy.  4/12/2024 - paracentesis - 5050 cc removed Path: ABDOMINAL FLUID DRAINAGE (INCLUDING THINPREP AND CELL BLOCK) -  NEGATIVE FOR MALIGNANT CELLS -  Scattered mesothelial cells, macrophages and a few inflammatory cells.  8/21/2024 paracentesis - 3250 cc removed  8/27/2024 - right thoracentesis - 1550 cc removed  Path: PLEURAL FLUID, LEFT, THORACENTESIS: POSITIVE FOR MALIGNANCY.  SEE COMMENT.  Overall features are most compatible with adenocarcinoma of lung origin.    PD-L1 Immunohistochemistry  Antibody: Renovo PDL1 () Tissue type: lung Block: Cell block/ A1 Result: Tumor Proportion Score (TPS)*:  80%  8/27/2024 CT chest  Moderate left loculated pleural effusion with trace pneumothorax. Layering high density material within the pleural effusion may represent trace hemorrhage. Left pleural catheter in place. New enlargement of the left hilum from opacities/lymphadenopathy, extending to the chain sutures in this region. New cavitary nodule in the left upper lobe. Findings can represent a combination of infectious and neoplastic process.  8/30/2024 L pleurx catheter placement   Since he's been home, he's gradually gettiing better - on dialysis 3x/week and left thoracentesis 3/wk with 160 cc removed two days ago. (T/Th/Sat) Denies any diarrhea, constipation, bleeding, fever, chills, pain

## 2024-09-12 NOTE — REVIEW OF SYSTEMS
[Fatigue] : fatigue [SOB on Exertion] : shortness of breath during exertion [Negative] : Allergic/Immunologic [FreeTextEntry9] : edema of b/l lower ext  [de-identified] : walks with cane

## 2024-09-16 ENCOUNTER — NON-APPOINTMENT (OUTPATIENT)
Age: 89
End: 2024-09-16

## 2024-09-17 ENCOUNTER — NON-APPOINTMENT (OUTPATIENT)
Age: 89
End: 2024-09-17

## 2024-09-17 ENCOUNTER — APPOINTMENT (OUTPATIENT)
Dept: CARDIOLOGY | Facility: CLINIC | Age: 89
End: 2024-09-17
Payer: MEDICARE

## 2024-09-17 VITALS
BODY MASS INDEX: 23.62 KG/M2 | SYSTOLIC BLOOD PRESSURE: 100 MMHG | DIASTOLIC BLOOD PRESSURE: 60 MMHG | HEIGHT: 70 IN | HEART RATE: 97 BPM | WEIGHT: 165 LBS | OXYGEN SATURATION: 98 %

## 2024-09-17 VITALS — OXYGEN SATURATION: 99 % | DIASTOLIC BLOOD PRESSURE: 64 MMHG | HEART RATE: 97 BPM | SYSTOLIC BLOOD PRESSURE: 114 MMHG

## 2024-09-17 DIAGNOSIS — I25.10 ATHEROSCLEROTIC HEART DISEASE OF NATIVE CORONARY ARTERY W/OUT ANGINA PECTORIS: ICD-10-CM

## 2024-09-17 DIAGNOSIS — E78.5 HYPERLIPIDEMIA, UNSPECIFIED: ICD-10-CM

## 2024-09-17 DIAGNOSIS — I48.0 PAROXYSMAL ATRIAL FIBRILLATION: ICD-10-CM

## 2024-09-17 DIAGNOSIS — I10 ESSENTIAL (PRIMARY) HYPERTENSION: ICD-10-CM

## 2024-09-17 PROCEDURE — 99214 OFFICE O/P EST MOD 30 MIN: CPT | Mod: 25

## 2024-09-17 PROCEDURE — 93000 ELECTROCARDIOGRAM COMPLETE: CPT

## 2024-09-18 DIAGNOSIS — C34.92 MALIGNANT NEOPLASM OF UNSPECIFIED PART OF LEFT BRONCHUS OR LUNG: ICD-10-CM

## 2024-09-18 NOTE — REASON FOR VISIT
[Other: _____] : [unfilled] [FreeTextEntry1] : Laurent Phillips presents for follow up visit. He was last seen on 10/2023.   In April 2024, he was admitted to [place] for gross hematuria s/p CBI with resolution and khalil catheter initially placed for CBI discontinued with normal PVR, treated empirically for possible urinary tract infection. He was found to have ascites s/p paracentesis. Echocardiogram obtained with unremarkable change in comparison to prior studies, viral hepatitis panel negative. Statin decreased to 5mg daily.  [place] [facility] admission from 8/27-8/31/24 with c/o SOB. He was found to have a large pleural effusions and ascites s/p paracentesis and thoracentesis. Chest tube was transitioned to a Pleurx catheter 8/30/24. Cytology from pleural fluid noted with malignant cells. Chest CT with new cavitary nodule in the left upper lobe.   Mr. Phillips denies chest pain, palpitations, dizziness or syncope. He has dyspnea on moderate exertion and bilateral lower extremity swelling. Pleurx catheter was drained recently. He is on dialysis twice a week.

## 2024-09-18 NOTE — PHYSICAL EXAM
[No Acute Distress] : no acute distress [Normal S1, S2] : normal S1, S2 [No Respiratory Distress] : no respiratory distress  [Decreased Breath Sounds Unilaterally Left Lung Base] : breath sounds were diminished over the left base [Edema ___] : edema [unfilled] [Moves all extremities] : moves all extremities [No Focal Deficits] : no focal deficits [Normal Speech] : normal speech [Alert and Oriented] : alert and oriented [de-identified] : left PleuRx catheter in place with dressing intact

## 2024-09-18 NOTE — DISCUSSION/SUMMARY
[Patient] : the patient [EKG obtained to assist in diagnosis and management of assessed problem(s)] : EKG obtained to assist in diagnosis and management of assessed problem(s) [FreeTextEntry2] : and sedrick Del Real [FreeTextEntry3] : 9/26/24 [employer] device clinic for full device analysis, visit with Dr. Bazzi on 10/23/24

## 2024-09-18 NOTE — REVIEW OF SYSTEMS
[Weight Loss (___ Lbs)] : [unfilled] ~Ulb weight loss [Dyspnea on exertion] : dyspnea during exertion [Lower Ext Edema] : lower extremity edema [Joint Pain] : joint pain [Easy Bruising] : a tendency for easy bruising [Hearing Loss] : hearing loss [Chest Discomfort] : no chest discomfort [Palpitations] : no palpitations [Syncope] : no syncope [Abdominal Pain] : no abdominal pain [Confusion] : no confusion was observed [Easy Bleeding] : no tendency for easy bleeding [FreeTextEntry2] : significant weight loss from 10/2023

## 2024-09-18 NOTE — CARDIOLOGY SUMMARY
[LVEF ___%] : LVEF [unfilled]% [Enlarged] : enlarged LA size [Mild] : mild mitral regurgitation [___] : [unfilled] [de-identified] : 9/17/24 v paced [de-identified] : 4/12/24 TTE 1. Left ventricular systolic function is normal with an ejection fraction visually estimated at 55 to 60 %. Abnormal septal motion which may be due to conduction delay, cannot rule out right ventricular pressure/volume overload as right heart not optimally visualized and pulmonary artery pressures not obtained. On limited views RV function appears grossly normal. 2. The left ventricular diastolic function is indeterminate. 3. The left atrium is moderately dilated. 4. Mild to moderate mitral regurgitation. 5. Mild aortic regurgitation. 6. Pulmonary artery systolic pressure could not be estimated. 7. Elevated right atrial pressures. 8. No pericardial effusion seen. 9. Aortic root at the sinuses of Valsalva is dilated, measuring 4.20 cm (indexed 2.04 cm/m). 10. Left pleural effusion noted.

## 2024-09-18 NOTE — HISTORY OF PRESENT ILLNESS
[FreeTextEntry1] : 89 year old male with High degree AV block s/p PPM on 9/24/2020, hypertension, dyslipidemia, coronary artery calcification on CT, ESRD on HD, prostate cancer 2012 s/p chemoradiation, bladder cancer 2012 s/p surgery with recurrence 2015 s/p surgery, left lung cancer s/p left lobectomy 2012, right lunch cancer s/p RUL posterior segmentectomy 8/2020.

## 2024-09-24 ENCOUNTER — RESULT REVIEW (OUTPATIENT)
Age: 89
End: 2024-09-24

## 2024-09-26 ENCOUNTER — APPOINTMENT (OUTPATIENT)
Dept: CARDIOLOGY | Facility: CLINIC | Age: 89
End: 2024-09-26
Payer: MEDICARE

## 2024-09-26 ENCOUNTER — NON-APPOINTMENT (OUTPATIENT)
Age: 89
End: 2024-09-26

## 2024-09-26 VITALS — HEART RATE: 90 BPM | SYSTOLIC BLOOD PRESSURE: 116 MMHG | OXYGEN SATURATION: 95 % | DIASTOLIC BLOOD PRESSURE: 70 MMHG

## 2024-09-26 PROCEDURE — 93280 PM DEVICE PROGR EVAL DUAL: CPT

## 2024-09-26 PROCEDURE — 99213 OFFICE O/P EST LOW 20 MIN: CPT | Mod: 25

## 2024-09-26 NOTE — HISTORY OF PRESENT ILLNESS
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   In April 2024, he was admitted to [place] for gross hematuria s/p CBI with resolution and khalil catheter initially placed for CBI discontinued with normal PVR, treated empirically for possible urinary tract infection. He was found to have ascites s/p paracentesis. Echocardiogram obtained with unremarkable change in comparison to prior studies, viral hepatitis panel negative. Statin decreased to 5mg daily.  [place] [facility] admission from 8/27-8/31/24 with c/o SOB. He was found to have a large pleural effusions and ascites s/p paracentesis and thoracentesis. Chest tube was transitioned to a Pleurx catheter 8/30/24. Cytology from pleural fluid noted with malignant cells. Chest CT with new cavitary nodule in the left upper lobe.  Remote monitoring showed elevated RV thresholds, patient is here today for device interrogation.

## 2024-09-26 NOTE — DISCUSSION/SUMMARY
[Patient] : the patient [Family] : the patient's family [de-identified] : Elevated RV threshold [de-identified] : Refer to EP, follow up visit with Dr. Bazzi as scheduled. Continue remote monitoring.

## 2024-09-26 NOTE — PROCEDURE
[No] : not [NSR] : normal sinus rhythm [Pacemaker] : pacemaker [Medtronic] : Medtronic [DDDR] : DDDR [Threshold Testing Performed] : Threshold testing was performed [None] : none [Counters Reset] : the counters were reset [de-identified] : Rhona GONZALES DR MRI SureScan W1DR01 [de-identified] : [id number] [de-identified] : 9/23/20 [de-identified] : 60/120 [de-identified] : 1.4 years [de-identified] : Elevated RV threshold

## 2024-09-26 NOTE — DISCUSSION/SUMMARY
[Patient] : the patient [Family] : the patient's family [de-identified] : Elevated RV threshold [de-identified] : Refer to EP, follow up visit with Dr. Bazzi as scheduled. Continue remote monitoring.

## 2024-09-26 NOTE — PROCEDURE
[No] : not [NSR] : normal sinus rhythm [Pacemaker] : pacemaker [Medtronic] : Medtronic [DDDR] : DDDR [Threshold Testing Performed] : Threshold testing was performed [None] : none [Counters Reset] : the counters were reset [de-identified] : Rhona GONZALES DR MRI SureScan W1DR01 [de-identified] : [id number] [de-identified] : 9/23/20 [de-identified] : 60/120 [de-identified] : 1.4 years [de-identified] : Elevated RV threshold

## 2024-09-26 NOTE — CARDIOLOGY SUMMARY
[LVEF ___%] : LVEF [unfilled]% [Enlarged] : enlarged LA size [Mild] : mild mitral regurgitation [___] : [unfilled] [de-identified] : 9/17/24 v paced [de-identified] : 4/12/24 TTE 1. Left ventricular systolic function is normal with an ejection fraction visually estimated at 55 to 60 %. Abnormal septal motion which may be due to conduction delay, cannot rule out right ventricular pressure/volume overload as right heart not optimally visualized and pulmonary artery pressures not obtained. On limited views RV function appears grossly normal. 2. The left ventricular diastolic function is indeterminate. 3. The left atrium is moderately dilated. 4. Mild to moderate mitral regurgitation. 5. Mild aortic regurgitation. 6. Pulmonary artery systolic pressure could not be estimated. 7. Elevated right atrial pressures. 8. No pericardial effusion seen. 9. Aortic root at the sinuses of Valsalva is dilated, measuring 4.20 cm (indexed 2.04 cm/m). 10. Left pleural effusion noted.

## 2024-09-26 NOTE — REVIEW OF SYSTEMS
[Feeling Fatigued] : feeling fatigued [Dyspnea on exertion] : dyspnea during exertion [Lower Ext Edema] : lower extremity edema Olanzapine Pregnancy And Lactation Text: This medication is pregnancy category C.   There are no adequate and well controlled trials with olanzapine in pregnant females.  Olanzapine should be used during pregnancy only if the potential benefit justifies the potential risk to the fetus.   In a study in lactating healthy women, olanzapine was excreted in breast milk.  It is recommended that women taking olanzapine should not breast feed.

## 2024-09-26 NOTE — HISTORY OF PRESENT ILLNESS
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   In April 2024, he was admitted to [place] for gross hematuria s/p CBI with resolution and khalil catheter initially placed for CBI discontinued with normal PVR, treated empirically for possible urinary tract infection. He was found to have ascites s/p paracentesis. Echocardiogram obtained with unremarkable change in comparison to prior studies, viral hepatitis panel negative. Statin decreased to 5mg daily.  [place] [facility] admission from 8/27-8/31/24 with c/o SOB. He was found to have a large pleural effusions and ascites s/p paracentesis and thoracentesis. Chest tube was transitioned to a Pleurx catheter 8/30/24. Cytology from pleural fluid noted with malignant cells. Chest CT with new cavitary nodule in the left upper lobe.  Remote monitoring showed elevated RV thresholds, patient is here today for device interrogation.

## 2024-09-26 NOTE — PHYSICAL EXAM
[Normal Appearance] : normal appearance [Well Groomed] : well groomed [General Appearance - In No Acute Distress] : no acute distress [Heart Rate And Rhythm] : heart rate and rhythm were normal [Heart Sounds] : normal S1 and S2 [] : no respiratory distress [Respiration, Rhythm And Depth] : normal respiratory rhythm and effort [FreeTextEntry1] : bilateral LE edema

## 2024-09-26 NOTE — REVIEW OF SYSTEMS
[Feeling Fatigued] : feeling fatigued [Dyspnea on exertion] : dyspnea during exertion [Lower Ext Edema] : lower extremity edema

## 2024-09-26 NOTE — CARDIOLOGY SUMMARY
[LVEF ___%] : LVEF [unfilled]% [Enlarged] : enlarged LA size [Mild] : mild mitral regurgitation [___] : [unfilled] [de-identified] : 9/17/24 v paced [de-identified] : 4/12/24 TTE 1. Left ventricular systolic function is normal with an ejection fraction visually estimated at 55 to 60 %. Abnormal septal motion which may be due to conduction delay, cannot rule out right ventricular pressure/volume overload as right heart not optimally visualized and pulmonary artery pressures not obtained. On limited views RV function appears grossly normal. 2. The left ventricular diastolic function is indeterminate. 3. The left atrium is moderately dilated. 4. Mild to moderate mitral regurgitation. 5. Mild aortic regurgitation. 6. Pulmonary artery systolic pressure could not be estimated. 7. Elevated right atrial pressures. 8. No pericardial effusion seen. 9. Aortic root at the sinuses of Valsalva is dilated, measuring 4.20 cm (indexed 2.04 cm/m). 10. Left pleural effusion noted.

## 2024-09-26 NOTE — REASON FOR VISIT
[Follow-up Device Check] : is here today for a follow-up device check visit for [Other ___] : [unfilled] [Other: _____] : [unfilled]

## 2024-10-01 ENCOUNTER — APPOINTMENT (OUTPATIENT)
Dept: HEART AND VASCULAR | Facility: CLINIC | Age: 89
End: 2024-10-01
Payer: MEDICARE

## 2024-10-01 VITALS
SYSTOLIC BLOOD PRESSURE: 124 MMHG | BODY MASS INDEX: 23.91 KG/M2 | DIASTOLIC BLOOD PRESSURE: 66 MMHG | HEIGHT: 70 IN | WEIGHT: 167 LBS

## 2024-10-01 DIAGNOSIS — Z95.0 PRESENCE OF CARDIAC PACEMAKER: ICD-10-CM

## 2024-10-01 DIAGNOSIS — I48.0 PAROXYSMAL ATRIAL FIBRILLATION: ICD-10-CM

## 2024-10-01 DIAGNOSIS — I44.2 ATRIOVENTRICULAR BLOCK, COMPLETE: ICD-10-CM

## 2024-10-01 PROCEDURE — 93280 PM DEVICE PROGR EVAL DUAL: CPT

## 2024-10-01 PROCEDURE — 99204 OFFICE O/P NEW MOD 45 MIN: CPT | Mod: 25

## 2024-10-03 ENCOUNTER — APPOINTMENT (OUTPATIENT)
Dept: HEMATOLOGY ONCOLOGY | Facility: CLINIC | Age: 89
End: 2024-10-03
Payer: MEDICARE

## 2024-10-03 ENCOUNTER — RESULT REVIEW (OUTPATIENT)
Age: 89
End: 2024-10-03

## 2024-10-03 VITALS
SYSTOLIC BLOOD PRESSURE: 104 MMHG | RESPIRATION RATE: 16 BRPM | DIASTOLIC BLOOD PRESSURE: 63 MMHG | TEMPERATURE: 96.5 F | OXYGEN SATURATION: 93 % | HEIGHT: 70 IN | HEART RATE: 81 BPM | WEIGHT: 172.31 LBS | BODY MASS INDEX: 24.67 KG/M2

## 2024-10-03 DIAGNOSIS — C34.92 MALIGNANT NEOPLASM OF UNSPECIFIED PART OF LEFT BRONCHUS OR LUNG: ICD-10-CM

## 2024-10-03 DIAGNOSIS — K76.9 LIVER DISEASE, UNSPECIFIED: ICD-10-CM

## 2024-10-03 DIAGNOSIS — G89.3 NEOPLASM RELATED PAIN (ACUTE) (CHRONIC): ICD-10-CM

## 2024-10-03 DIAGNOSIS — Z51.12 ENCOUNTER FOR ANTINEOPLASTIC IMMUNOTHERAPY: ICD-10-CM

## 2024-10-03 DIAGNOSIS — C79.51 NEOPLASM RELATED PAIN (ACUTE) (CHRONIC): ICD-10-CM

## 2024-10-03 DIAGNOSIS — R06.02 SHORTNESS OF BREATH: ICD-10-CM

## 2024-10-03 DIAGNOSIS — J91.0 MALIGNANT PLEURAL EFFUSION: ICD-10-CM

## 2024-10-03 PROCEDURE — 99215 OFFICE O/P EST HI 40 MIN: CPT

## 2024-10-03 PROCEDURE — G2212 PROLONG OUTPT/OFFICE VIS: CPT

## 2024-10-03 PROCEDURE — G2211 COMPLEX E/M VISIT ADD ON: CPT

## 2024-10-03 NOTE — REVIEW OF SYSTEMS
[Fatigue] : fatigue [SOB on Exertion] : shortness of breath during exertion [Negative] : Allergic/Immunologic [FreeTextEntry9] : edema of b/l lower ext  [de-identified] : walks with cane

## 2024-10-03 NOTE — HISTORY OF PRESENT ILLNESS
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   [facility] admission from 8/27-8/31/24 with c/o SOB. He was found to have a large pleural effusions and ascites s/p paracentesis and thoracentesis. Chest tube was transitioned to a Pleurx catheter 8/30/24. Cytology from pleural fluid noted with malignant cells. Chest CT with new cavitary nodule in the left upper lobe.  Remote monitoring and previous device check has showed elevated RV thresholds, patient is here today for device interrogation.  He feels well.  He denies any dizziness, syncope, palpitations.

## 2024-10-03 NOTE — HISTORY OF PRESENT ILLNESS
[de-identified] : Mr. Laurent Phillips is 87 year old male with recurrent R lung cancer referred by Dr. Vu for further evaluation.  Patient with medical history of renal insufficiency, htn, nocturia who was diagnosed with  prostate cancer in 2012 s/p chemoradiation, high grade noninvasive bladder cancer in 2012 during work up for prostate cancer s/p surgery, recurrence in 2015 s/p surgery with Dr. Ledesma. Patient was also found to have left lung cancer s/p left left lobectomy with Dr. Vu 2012 during further work up for bladder cancer.  Patient now with recurrence of lung cancer of right lung with scans and surgery below.  3/6/20 - Right upper lung, biopsy: Squamous cell carcinoma. PD-L1 < 1%  6/8/20 Ct of abdomen/pelvis -  No significant change in size or appearance of either kidney since prior sonogram of 9/14/2017, with diffuse increased cortical parenchymal echogenicity and cortical parenchymal thinning that are compatible with medical renal disease.  Unremarkable appearance of urinary bladder with 62 cc post void residual. 7/30/20 Ct scan -Increase in size of a multilobulated and spiculated mass at the posterior aspect of the right upper lobe that is difficult to accurately measure due to multilobulation, but the bulkiest portion of which currently measures 2 x 1.2 x 1.8 cm at lung windows, compared to 1.5 x 1.1 x 1.5 cm on 2/3/2020. There has been no change in multiple additional smaller bilateral pulmonary nodules  8/12/20  RUL posterior segmentectomy Pathology:  Pathologic Staging:  pT2**(2*) N0 -Moderate differentiated squamous cell carcinoma: size - 2.8 cm -Pleural invasion to the visceral pleural surface present and supported by elastic tissue special stain performed on 4 blocks - associated focal squamous cell carcinoma -Adenocarcinoma: size 1.3 cm.  -Types: Acinar (70%), micropapillary (20%), and papillary (10%) -Spread thorugh alveolar spaces present. -Narrowest parenchymal margin: < 1mm from tissue inked underlying the 2mm thick stapled line.  -Additional findings: atypical adenomatous hyperplasia (AAH) -negative for metasetasis in one incidental LN (0/1).  Patient with no family history of hematology and/or oncological disorders.  He was a prior smoker - smoked about a pack a day x  22 years (quit 50 yrs ago).   Patient with SOB on exertion but comfortable sitting still.  Last PSA checked two weeks ago, been normalized.   Reports end of September 2020, he passed out s/p fall nad hit his head. Was taken to [facility]. Was found to have CHB s/p PPM  Healthcare proxy - Gt Phillips  Recently admitted at [facility] from 8/27/2024 - 8/31/2024 for SOB and found to have pleural effusions and ascites s/p paracentesis and thoracentesis with path positive for lung malignancy.  4/12/2024 - paracentesis - 5050 cc removed Path: ABDOMINAL FLUID DRAINAGE (INCLUDING THINPREP AND CELL BLOCK) -  NEGATIVE FOR MALIGNANT CELLS -  Scattered mesothelial cells, macrophages and a few inflammatory cells.  8/21/2024 paracentesis - 3250 cc removed  8/27/2024 - right thoracentesis - 1550 cc removed  Path: PLEURAL FLUID, LEFT, THORACENTESIS: POSITIVE FOR MALIGNANCY.  SEE COMMENT.  Overall features are most compatible with adenocarcinoma of lung origin.    PD-L1 Immunohistochemistry  Antibody: [employer]foot PDL1 () Tissue type: lung Block: Cell block/ A1 Result: Tumor Proportion Score (TPS)*:  80%  8/27/2024 CT chest  Moderate left loculated pleural effusion with trace pneumothorax. Layering high density material within the pleural effusion may represent trace hemorrhage. Left pleural catheter in place. New enlargement of the left hilum from opacities/lymphadenopathy, extending to the chain sutures in this region. New cavitary nodule in the left upper lobe. Findings can represent a combination of infectious and neoplastic process.  8/30/2024 L pleurx catheter placement   HE is seen today for post hospital follow up to discuss management of hi patient was last seen in Dec 2020  [de-identified] : Patient is seen today for follow up and Keytruda #1 (10/3/24) Accompanied by his son Gt  Last week he has been draining 25 cc TIW.  Breathing continues to be a problem Per his son- he does gets pain at times declined PT. patient reports pain is short lasting Tylenol works very well. He has another medication which he can take Q6H PRN but does not remember the name. Son will check and call my office with the information  PET/CT (9/24/24)  1. Increase in size of a 3.9 x 2.7 cm cavitary left apical mass, SUV max 16.3, most likely representing malignancy.  2. Small anterior left pneumothorax. Confirm proper functioning of the left tunneled pleural catheter. Interval decrease in size of a now small left pleural effusion.  3. Hypermetabolic left hilar nodes and left perihilar soft tissue, consistent with metastatic disease.  4. Hypermetabolic mediastinal nodes, consistent with metastatic disease.  5. Mildly FDG avid subcentimeter subpleural nodule at the superior right lower lobe, indeterminate in nature. Attention on future studies.  6. Focal uptake along the left border of hepatic segment 2, SUV max 19.6, consistent with malignancy, probably a second primary tumor or metastasis. Further evaluation with MRI of the liver is recommended. Cirrhotic morphology of the liver. Moderate ascites.  7. Numerous scattered osseous metastases, notably an intensely FDG avid lesion at vertebral body L5. Small FDG avid implant in the right psoas muscle.  8. 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 Continued surveillance is recommended.  CT Head: No mets

## 2024-10-03 NOTE — HISTORY OF PRESENT ILLNESS
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   [facility] admission from 8/27-8/31/24 with c/o SOB. He was found to have a large pleural effusions and ascites s/p paracentesis and thoracentesis. Chest tube was transitioned to a Pleurx catheter 8/30/24. Cytology from pleural fluid noted with malignant cells. Chest CT with new cavitary nodule in the left upper lobe.  Remote monitoring and previous device check has showed elevated RV thresholds, patient is here today for device interrogation.  He feels well.  He denies any dizziness, syncope, palpitations.

## 2024-10-03 NOTE — PHYSICAL EXAM
[No Acute Distress] : no acute distress [Normal Venous Pressure] : normal venous pressure [Normal S1, S2] : normal S1, S2 [No Respiratory Distress] : no respiratory distress  [Soft] : abdomen soft [No Edema] : no edema [No Rash] : no rash [Moves all extremities] : moves all extremities [No Focal Deficits] : no focal deficits

## 2024-10-03 NOTE — REVIEW OF SYSTEMS
[SOB] : shortness of breath [Dyspnea on exertion] : dyspnea during exertion [Fever] : no fever [Chills] : no chills [Feeling Fatigued] : not feeling fatigued [Chest Discomfort] : no chest discomfort [Lower Ext Edema] : no extremity edema [Palpitations] : no palpitations [Orthopnea] : no orthopnea [PND] : no PND [Syncope] : no syncope [Cough] : no cough [Dizziness] : no dizziness [Weakness] : no weakness

## 2024-10-03 NOTE — HISTORY OF PRESENT ILLNESS
[FreeTextEntry1] : 89yo M, PMHx of HTN, HLD, CAD on CT, ESRD on HD, prostate CA, High degree AV block s/p PPM on 9/24/2020 ([employer]), prostate cancer 2012 s/p chemoradiation, bladder cancer 2012 s/p surgery with recurrence 2015 s/p surgery, left lung cancer s/p left lobectomy 2012, right lung cancer s/p RUL posterior segmentectomy 8/2020.   [facility] admission from 8/27-8/31/24 with c/o SOB. He was found to have a large pleural effusions and ascites s/p paracentesis and thoracentesis. Chest tube was transitioned to a Pleurx catheter 8/30/24. Cytology from pleural fluid noted with malignant cells. Chest CT with new cavitary nodule in the left upper lobe.  Remote monitoring and previous device check has showed elevated RV thresholds, patient is here today for device interrogation.  He feels well.  He denies any dizziness, syncope, palpitations.

## 2024-10-03 NOTE — PHYSICAL EXAM
[Ambulatory and capable of all self care but unable to carry out any work activities] : Status 2- Ambulatory and capable of all self care but unable to carry out any work activities. Up and about more than 50% of waking hours [Normal] : affect appropriate [de-identified] : walks with cane, hard of hearing b/l ears  [de-identified] : +left pleurx [de-identified] : edema of b/l lower ext

## 2024-10-03 NOTE — ADDENDUM
[FreeTextEntry1] : I, Mar Centeno, am scribing for and the presence of Dr. Prescott the following sections: HPI, PMH,Family/social history, ROS, Physical Exam, Assessment / Plan. I, Law Prescott, personally performed the services described in the documentation, reviewed the documentation recorded by the scribe in my presence and it accurately and completely records my words and actions.

## 2024-10-03 NOTE — HISTORY OF PRESENT ILLNESS
[de-identified] : Mr. Laurent Phillips is 87 year old male with recurrent R lung cancer referred by Dr. Vu for further evaluation.  Patient with medical history of renal insufficiency, htn, nocturia who was diagnosed with  prostate cancer in 2012 s/p chemoradiation, high grade noninvasive bladder cancer in 2012 during work up for prostate cancer s/p surgery, recurrence in 2015 s/p surgery with Dr. Ledesma. Patient was also found to have left lung cancer s/p left left lobectomy with Dr. Vu 2012 during further work up for bladder cancer.  Patient now with recurrence of lung cancer of right lung with scans and surgery below.  3/6/20 - Right upper lung, biopsy: Squamous cell carcinoma. PD-L1 < 1%  6/8/20 Ct of abdomen/pelvis -  No significant change in size or appearance of either kidney since prior sonogram of 9/14/2017, with diffuse increased cortical parenchymal echogenicity and cortical parenchymal thinning that are compatible with medical renal disease.  Unremarkable appearance of urinary bladder with 62 cc post void residual. 7/30/20 Ct scan -Increase in size of a multilobulated and spiculated mass at the posterior aspect of the right upper lobe that is difficult to accurately measure due to multilobulation, but the bulkiest portion of which currently measures 2 x 1.2 x 1.8 cm at lung windows, compared to 1.5 x 1.1 x 1.5 cm on 2/3/2020. There has been no change in multiple additional smaller bilateral pulmonary nodules  8/12/20  RUL posterior segmentectomy Pathology:  Pathologic Staging:  pT2**(2*) N0 -Moderate differentiated squamous cell carcinoma: size - 2.8 cm -Pleural invasion to the visceral pleural surface present and supported by elastic tissue special stain performed on 4 blocks - associated focal squamous cell carcinoma -Adenocarcinoma: size 1.3 cm.  -Types: Acinar (70%), micropapillary (20%), and papillary (10%) -Spread thorugh alveolar spaces present. -Narrowest parenchymal margin: < 1mm from tissue inked underlying the 2mm thick stapled line.  -Additional findings: atypical adenomatous hyperplasia (AAH) -negative for metasetasis in one incidental LN (0/1).  Patient with no family history of hematology and/or oncological disorders.  He was a prior smoker - smoked about a pack a day x  22 years (quit 50 yrs ago).   Patient with SOB on exertion but comfortable sitting still.  Last PSA checked two weeks ago, been normalized.   Reports end of September 2020, he passed out s/p fall nad hit his head. Was taken to [facility]. Was found to have CHB s/p PPM  Healthcare proxy - Gt Phillips  Recently admitted at [facility] from 8/27/2024 - 8/31/2024 for SOB and found to have pleural effusions and ascites s/p paracentesis and thoracentesis with path positive for lung malignancy.  4/12/2024 - paracentesis - 5050 cc removed Path: ABDOMINAL FLUID DRAINAGE (INCLUDING THINPREP AND CELL BLOCK) -  NEGATIVE FOR MALIGNANT CELLS -  Scattered mesothelial cells, macrophages and a few inflammatory cells.  8/21/2024 paracentesis - 3250 cc removed  8/27/2024 - right thoracentesis - 1550 cc removed  Path: PLEURAL FLUID, LEFT, THORACENTESIS: POSITIVE FOR MALIGNANCY.  SEE COMMENT.  Overall features are most compatible with adenocarcinoma of lung origin.    PD-L1 Immunohistochemistry  Antibody: Samson PDL1 () Tissue type: lung Block: Cell block/ A1 Result: Tumor Proportion Score (TPS)*:  80%  8/27/2024 CT chest  Moderate left loculated pleural effusion with trace pneumothorax. Layering high density material within the pleural effusion may represent trace hemorrhage. Left pleural catheter in place. New enlargement of the left hilum from opacities/lymphadenopathy, extending to the chain sutures in this region. New cavitary nodule in the left upper lobe. Findings can represent a combination of infectious and neoplastic process.  8/30/2024 L pleurx catheter placement   HE is seen today for post hospital follow up to discuss management of hi patient was last seen in Dec 2020  [de-identified] : Patient is seen today for follow up and Keytruda #1 (10/3/24) Accompanied by his son Gt  Last week he has been draining 25 cc TIW.  Breathing continues to be a problem Per his son- he does gets pain at times declined PT. patient reports pain is short lasting Tylenol works very well. He has another medication which he can take Q6H PRN but does not remember the name. Son will check and call my office with the information  PET/CT (9/24/24)  1. Increase in size of a 3.9 x 2.7 cm cavitary left apical mass, SUV max 16.3, most likely representing malignancy.  2. Small anterior left pneumothorax. Confirm proper functioning of the left tunneled pleural catheter. Interval decrease in size of a now small left pleural effusion.  3. Hypermetabolic left hilar nodes and left perihilar soft tissue, consistent with metastatic disease.  4. Hypermetabolic mediastinal nodes, consistent with metastatic disease.  5. Mildly FDG avid subcentimeter subpleural nodule at the superior right lower lobe, indeterminate in nature. Attention on future studies.  6. Focal uptake along the left border of hepatic segment 2, SUV max 19.6, consistent with malignancy, probably a second primary tumor or metastasis. Further evaluation with MRI of the liver is recommended. Cirrhotic morphology of the liver. Moderate ascites.  7. Numerous scattered osseous metastases, notably an intensely FDG avid lesion at vertebral body L5. Small FDG avid implant in the right psoas muscle.  8. 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 Continued surveillance is recommended.  CT Head: No mets

## 2024-10-03 NOTE — ASSESSMENT
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 Layering high density material within the pleural effusion may represent trace hemorrhage. Left pleural catheter in place. New enlargement of the left hilum from opacities/lymphadenopathy, extending to the chain sutures in this region. New cavitary nodule in the left upper lobe. Findings can represent a combination of infectious and neoplastic process Mri brain- cannot be obtained due to PPM CT head without any mets  Patient is seen today for follow up and Keytruda #1 (10/3/24) SYmptoms as outlined in HPI PET/CT shows hypermetabolic hilar, mediastinal, subpleural nodules, liver mets, bone mets, scattered bone mets. Also mention of liver cirrhosis Patient denies pain but son reports he is in pain. So it is very confusing Labs ordered, drawn in the office, reviewed, analyzed and discussed Proceed with keytruda I have reviewed the risks, benefits and side effects of immunotherapy with the patient including inflammation in any part of the body from head to toe including thyroiditis, hypophysitis, pneumonitis, hepatitis, colitis, myositis, myocarditis, arthritis, neuropathy, etc. All questions were answered to satisfaction. Patient agrees to pursue the planned immunotherapy. Plan: Keytruda 200 mg Q3W, change to 400 mg Q6w after a few cycles as long as he is tolerating well Add Xgeva with C2 CXR today to asses PTx and effusion. Will discuss with IR Pal care eval same day as C2 Second line KRAS inhibitor  RUL lung cancer 2 Foci- squamous cell and adenocarcinoma Pathological stage pT2**(2*) N0 Visceral pleural involvement positive Opted against adjuvant treatment He is seen today for follow up No cough, sob Right apical densities- ? post op vs residual PET (12/5- NO fdg avid malignancy No interventions needed for now  Left lung cancer s/p SHONNA resection in 2012  History of bladder cancer and prostate cancer Follows with Dr Bolaños  Follow up in 3 weeks for C2 or sooner if needed CBC, CMP, ESR, CRP, TSH

## 2024-10-03 NOTE — PHYSICAL EXAM
[Ambulatory and capable of all self care but unable to carry out any work activities] : Status 2- Ambulatory and capable of all self care but unable to carry out any work activities. Up and about more than 50% of waking hours [Normal] : affect appropriate [de-identified] : walks with cane, hard of hearing b/l ears  [de-identified] : +left pleurx [de-identified] : edema of b/l lower ext

## 2024-10-03 NOTE — REVIEW OF SYSTEMS
[Fatigue] : fatigue [SOB on Exertion] : shortness of breath during exertion [Negative] : Allergic/Immunologic [FreeTextEntry9] : edema of b/l lower ext  [de-identified] : walks with cane

## 2024-10-16 ENCOUNTER — NON-APPOINTMENT (OUTPATIENT)
Age: 89
End: 2024-10-16

## 2024-10-18 ENCOUNTER — NON-APPOINTMENT (OUTPATIENT)
Age: 89
End: 2024-10-18

## 2024-10-23 ENCOUNTER — RESULT REVIEW (OUTPATIENT)
Age: 89
End: 2024-10-23

## 2024-10-23 ENCOUNTER — APPOINTMENT (OUTPATIENT)
Dept: GERIATRICS | Facility: CLINIC | Age: 89
End: 2024-10-23

## 2024-10-23 ENCOUNTER — APPOINTMENT (OUTPATIENT)
Dept: HEMATOLOGY ONCOLOGY | Facility: CLINIC | Age: 89
End: 2024-10-23
Payer: MEDICARE

## 2024-10-23 VITALS
DIASTOLIC BLOOD PRESSURE: 60 MMHG | RESPIRATION RATE: 16 BRPM | OXYGEN SATURATION: 96 % | SYSTOLIC BLOOD PRESSURE: 111 MMHG | HEART RATE: 100 BPM

## 2024-10-23 DIAGNOSIS — C34.92 MALIGNANT NEOPLASM OF UNSPECIFIED PART OF LEFT BRONCHUS OR LUNG: ICD-10-CM

## 2024-10-23 DIAGNOSIS — Z51.12 ENCOUNTER FOR ANTINEOPLASTIC IMMUNOTHERAPY: ICD-10-CM

## 2024-10-23 DIAGNOSIS — J91.0 MALIGNANT PLEURAL EFFUSION: ICD-10-CM

## 2024-10-23 DIAGNOSIS — G89.3 NEOPLASM RELATED PAIN (ACUTE) (CHRONIC): ICD-10-CM

## 2024-10-23 DIAGNOSIS — C79.51 NEOPLASM RELATED PAIN (ACUTE) (CHRONIC): ICD-10-CM

## 2024-10-23 PROBLEM — G47.09 OTHER INSOMNIA: Status: ACTIVE | Noted: 2024-10-23

## 2024-10-23 PROCEDURE — G2211 COMPLEX E/M VISIT ADD ON: CPT

## 2024-10-23 PROCEDURE — 99214 OFFICE O/P EST MOD 30 MIN: CPT

## 2024-10-23 PROCEDURE — 36415 COLL VENOUS BLD VENIPUNCTURE: CPT

## 2024-10-23 PROCEDURE — 99215 OFFICE O/P EST HI 40 MIN: CPT

## 2024-10-23 PROCEDURE — 99205 OFFICE O/P NEW HI 60 MIN: CPT

## 2024-10-23 RX ORDER — RAMELTEON 8 MG/1
8 TABLET ORAL
Qty: 30 | Refills: 0 | Status: ACTIVE | COMMUNITY
Start: 2024-10-23 | End: 1900-01-01

## 2024-10-24 ENCOUNTER — APPOINTMENT (OUTPATIENT)
Dept: CARDIOLOGY | Facility: CLINIC | Age: 89
End: 2024-10-24
Payer: MEDICARE

## 2024-10-24 VITALS
BODY MASS INDEX: 24.62 KG/M2 | DIASTOLIC BLOOD PRESSURE: 62 MMHG | HEIGHT: 70 IN | SYSTOLIC BLOOD PRESSURE: 104 MMHG | WEIGHT: 172 LBS

## 2024-10-24 DIAGNOSIS — K76.9 LIVER DISEASE, UNSPECIFIED: ICD-10-CM

## 2024-10-24 DIAGNOSIS — I48.0 PAROXYSMAL ATRIAL FIBRILLATION: ICD-10-CM

## 2024-10-24 DIAGNOSIS — E78.5 HYPERLIPIDEMIA, UNSPECIFIED: ICD-10-CM

## 2024-10-24 DIAGNOSIS — I45.10 UNSPECIFIED RIGHT BUNDLE-BRANCH BLOCK: ICD-10-CM

## 2024-10-24 DIAGNOSIS — Z95.0 PRESENCE OF CARDIAC PACEMAKER: ICD-10-CM

## 2024-10-24 DIAGNOSIS — I77.819 AORTIC ECTASIA, UNSPECIFIED SITE: ICD-10-CM

## 2024-10-24 DIAGNOSIS — R06.02 SHORTNESS OF BREATH: ICD-10-CM

## 2024-10-24 DIAGNOSIS — N18.4 CHRONIC KIDNEY DISEASE, STAGE 4 (SEVERE): ICD-10-CM

## 2024-10-24 DIAGNOSIS — I44.2 ATRIOVENTRICULAR BLOCK, COMPLETE: ICD-10-CM

## 2024-10-24 DIAGNOSIS — I25.10 ATHEROSCLEROTIC HEART DISEASE OF NATIVE CORONARY ARTERY W/OUT ANGINA PECTORIS: ICD-10-CM

## 2024-10-24 DIAGNOSIS — G47.09 OTHER INSOMNIA: ICD-10-CM

## 2024-10-24 PROCEDURE — 99215 OFFICE O/P EST HI 40 MIN: CPT | Mod: 25

## 2024-10-24 PROCEDURE — 93000 ELECTROCARDIOGRAM COMPLETE: CPT

## 2024-10-24 RX ORDER — HYDROMORPHONE HYDROCHLORIDE 2 MG/1
2 TABLET ORAL EVERY 8 HOURS
Qty: 45 | Refills: 0 | Status: ACTIVE | COMMUNITY
Start: 2024-10-24 | End: 1900-01-01

## 2024-10-30 ENCOUNTER — APPOINTMENT (OUTPATIENT)
Dept: RADIATION ONCOLOGY | Facility: CLINIC | Age: 89
End: 2024-10-30
Payer: MEDICARE

## 2024-10-30 VITALS
SYSTOLIC BLOOD PRESSURE: 119 MMHG | OXYGEN SATURATION: 96 % | DIASTOLIC BLOOD PRESSURE: 68 MMHG | HEART RATE: 85 BPM | HEIGHT: 70 IN | RESPIRATION RATE: 18 BRPM | WEIGHT: 172 LBS | BODY MASS INDEX: 24.62 KG/M2

## 2024-10-30 DIAGNOSIS — C79.51 SECONDARY MALIGNANT NEOPLASM OF BONE: ICD-10-CM

## 2024-10-30 PROCEDURE — 99205 OFFICE O/P NEW HI 60 MIN: CPT

## 2024-10-30 PROCEDURE — G2211 COMPLEX E/M VISIT ADD ON: CPT

## 2024-11-14 ENCOUNTER — RESULT REVIEW (OUTPATIENT)
Age: 89
End: 2024-11-14

## 2024-11-14 ENCOUNTER — APPOINTMENT (OUTPATIENT)
Dept: HEMATOLOGY ONCOLOGY | Facility: CLINIC | Age: 89
End: 2024-11-14
Payer: MEDICARE

## 2024-11-14 VITALS
DIASTOLIC BLOOD PRESSURE: 77 MMHG | BODY MASS INDEX: 23.94 KG/M2 | SYSTOLIC BLOOD PRESSURE: 121 MMHG | HEIGHT: 70 IN | TEMPERATURE: 96.5 F | RESPIRATION RATE: 16 BRPM | WEIGHT: 167.25 LBS | HEART RATE: 87 BPM | OXYGEN SATURATION: 94 %

## 2024-11-14 DIAGNOSIS — Z51.12 ENCOUNTER FOR ANTINEOPLASTIC IMMUNOTHERAPY: ICD-10-CM

## 2024-11-14 PROCEDURE — 99214 OFFICE O/P EST MOD 30 MIN: CPT

## 2024-11-14 PROCEDURE — G2211 COMPLEX E/M VISIT ADD ON: CPT

## 2024-11-19 ENCOUNTER — APPOINTMENT (OUTPATIENT)
Dept: HEART AND VASCULAR | Facility: CLINIC | Age: 89
End: 2024-11-19

## 2024-11-19 ENCOUNTER — NON-APPOINTMENT (OUTPATIENT)
Age: 89
End: 2024-11-19

## 2024-11-19 RX ORDER — ONDANSETRON 8 MG/1
8 TABLET, ORALLY DISINTEGRATING ORAL 3 TIMES DAILY
Qty: 30 | Refills: 0 | Status: ACTIVE | COMMUNITY
Start: 2024-11-19 | End: 1900-01-01

## 2024-11-19 RX ORDER — ONDANSETRON 4 MG/1
4 TABLET, ORALLY DISINTEGRATING ORAL
Qty: 0 | Refills: 0 | Status: COMPLETED | OUTPATIENT
Start: 2024-11-19

## 2024-11-21 ENCOUNTER — APPOINTMENT (OUTPATIENT)
Dept: GERIATRICS | Facility: CLINIC | Age: 89
End: 2024-11-21
Payer: MEDICARE

## 2024-11-21 VITALS
SYSTOLIC BLOOD PRESSURE: 128 MMHG | OXYGEN SATURATION: 93 % | BODY MASS INDEX: 23.96 KG/M2 | WEIGHT: 167 LBS | HEART RATE: 86 BPM | RESPIRATION RATE: 16 BRPM | DIASTOLIC BLOOD PRESSURE: 77 MMHG

## 2024-11-21 DIAGNOSIS — R18.8 OTHER ASCITES: ICD-10-CM

## 2024-11-21 DIAGNOSIS — N18.4 CHRONIC KIDNEY DISEASE, STAGE 4 (SEVERE): ICD-10-CM

## 2024-11-21 DIAGNOSIS — J91.0 MALIGNANT PLEURAL EFFUSION: ICD-10-CM

## 2024-11-21 DIAGNOSIS — N18.6 END STAGE RENAL DISEASE: ICD-10-CM

## 2024-11-21 DIAGNOSIS — Z99.2 END STAGE RENAL DISEASE: ICD-10-CM

## 2024-11-21 PROBLEM — R11.0 NAUSEA: Status: ACTIVE | Noted: 2024-11-19

## 2024-11-21 PROCEDURE — 99215 OFFICE O/P EST HI 40 MIN: CPT

## 2024-11-26 ENCOUNTER — RESULT REVIEW (OUTPATIENT)
Age: 89
End: 2024-11-26

## 2024-11-26 ENCOUNTER — APPOINTMENT (OUTPATIENT)
Dept: HEMATOLOGY ONCOLOGY | Facility: CLINIC | Age: 89
End: 2024-11-26
Payer: MEDICARE

## 2024-11-26 VITALS
HEART RATE: 104 BPM | DIASTOLIC BLOOD PRESSURE: 63 MMHG | HEIGHT: 70 IN | SYSTOLIC BLOOD PRESSURE: 112 MMHG | BODY MASS INDEX: 23.94 KG/M2 | OXYGEN SATURATION: 95 % | WEIGHT: 167.25 LBS | TEMPERATURE: 97.4 F | RESPIRATION RATE: 16 BRPM

## 2024-11-26 DIAGNOSIS — D69.6 THROMBOCYTOPENIA, UNSPECIFIED: ICD-10-CM

## 2024-11-26 DIAGNOSIS — C79.51 SECONDARY MALIGNANT NEOPLASM OF BONE: ICD-10-CM

## 2024-11-26 DIAGNOSIS — C34.92 MALIGNANT NEOPLASM OF UNSPECIFIED PART OF LEFT BRONCHUS OR LUNG: ICD-10-CM

## 2024-11-26 PROCEDURE — G2211 COMPLEX E/M VISIT ADD ON: CPT

## 2024-11-26 PROCEDURE — 36415 COLL VENOUS BLD VENIPUNCTURE: CPT

## 2024-11-26 PROCEDURE — 99215 OFFICE O/P EST HI 40 MIN: CPT

## 2024-11-27 ENCOUNTER — RESULT REVIEW (OUTPATIENT)
Age: 89
End: 2024-11-27

## 2024-12-17 ENCOUNTER — RESULT REVIEW (OUTPATIENT)
Age: 88
End: 2024-12-17

## 2024-12-17 ENCOUNTER — APPOINTMENT (OUTPATIENT)
Dept: HEMATOLOGY ONCOLOGY | Facility: CLINIC | Age: 88
End: 2024-12-17
Payer: MEDICARE

## 2024-12-17 VITALS
OXYGEN SATURATION: 92 % | WEIGHT: 171 LBS | RESPIRATION RATE: 16 BRPM | HEART RATE: 99 BPM | TEMPERATURE: 98 F | HEIGHT: 70 IN | DIASTOLIC BLOOD PRESSURE: 66 MMHG | SYSTOLIC BLOOD PRESSURE: 106 MMHG | BODY MASS INDEX: 24.48 KG/M2

## 2024-12-17 DIAGNOSIS — Z51.12 ENCOUNTER FOR ANTINEOPLASTIC IMMUNOTHERAPY: ICD-10-CM

## 2024-12-17 PROCEDURE — G2211 COMPLEX E/M VISIT ADD ON: CPT

## 2024-12-17 PROCEDURE — 36415 COLL VENOUS BLD VENIPUNCTURE: CPT

## 2024-12-17 PROCEDURE — 99214 OFFICE O/P EST MOD 30 MIN: CPT

## 2024-12-18 ENCOUNTER — NON-APPOINTMENT (OUTPATIENT)
Age: 88
End: 2024-12-18

## 2024-12-19 ENCOUNTER — APPOINTMENT (OUTPATIENT)
Dept: GERIATRICS | Facility: CLINIC | Age: 88
End: 2024-12-19
Payer: MEDICARE

## 2024-12-19 VITALS — OXYGEN SATURATION: 94 %

## 2024-12-19 VITALS
BODY MASS INDEX: 24.54 KG/M2 | WEIGHT: 171 LBS | SYSTOLIC BLOOD PRESSURE: 91 MMHG | DIASTOLIC BLOOD PRESSURE: 65 MMHG | HEART RATE: 65 BPM

## 2024-12-19 DIAGNOSIS — Z99.2 END STAGE RENAL DISEASE: ICD-10-CM

## 2024-12-19 DIAGNOSIS — G89.3 NEOPLASM RELATED PAIN (ACUTE) (CHRONIC): ICD-10-CM

## 2024-12-19 DIAGNOSIS — G47.09 OTHER INSOMNIA: ICD-10-CM

## 2024-12-19 DIAGNOSIS — I48.0 PAROXYSMAL ATRIAL FIBRILLATION: ICD-10-CM

## 2024-12-19 DIAGNOSIS — N18.6 END STAGE RENAL DISEASE: ICD-10-CM

## 2024-12-19 DIAGNOSIS — C34.92 MALIGNANT NEOPLASM OF UNSPECIFIED PART OF LEFT BRONCHUS OR LUNG: ICD-10-CM

## 2024-12-19 DIAGNOSIS — R18.8 OTHER ASCITES: ICD-10-CM

## 2024-12-19 DIAGNOSIS — C79.51 NEOPLASM RELATED PAIN (ACUTE) (CHRONIC): ICD-10-CM

## 2024-12-19 PROCEDURE — 99215 OFFICE O/P EST HI 40 MIN: CPT

## 2024-12-19 RX ORDER — FLUTICASONE PROPIONATE 50 UG/1
50 SPRAY, METERED NASAL TWICE DAILY
Qty: 2 | Refills: 2 | Status: ACTIVE | COMMUNITY
Start: 2024-12-19 | End: 1900-01-01

## 2024-12-19 RX ORDER — METHADONE HYDROCHLORIDE 5 MG/1
5 TABLET ORAL
Qty: 30 | Refills: 0 | Status: ACTIVE | COMMUNITY
Start: 2024-12-19 | End: 1900-01-01

## 2024-12-26 ENCOUNTER — RESULT REVIEW (OUTPATIENT)
Age: 88
End: 2024-12-26

## 2024-12-31 ENCOUNTER — RESULT REVIEW (OUTPATIENT)
Age: 88
End: 2024-12-31

## 2025-01-07 ENCOUNTER — RESULT REVIEW (OUTPATIENT)
Age: 89
End: 2025-01-07

## 2025-01-07 ENCOUNTER — APPOINTMENT (OUTPATIENT)
Dept: HEMATOLOGY ONCOLOGY | Facility: CLINIC | Age: 89
End: 2025-01-07
Payer: MEDICARE

## 2025-01-07 VITALS
DIASTOLIC BLOOD PRESSURE: 56 MMHG | RESPIRATION RATE: 16 BRPM | WEIGHT: 171.25 LBS | HEIGHT: 70 IN | BODY MASS INDEX: 24.52 KG/M2 | OXYGEN SATURATION: 98 % | HEART RATE: 96 BPM | SYSTOLIC BLOOD PRESSURE: 83 MMHG | TEMPERATURE: 98 F

## 2025-01-07 DIAGNOSIS — R18.8 OTHER ASCITES: ICD-10-CM

## 2025-01-07 DIAGNOSIS — Z51.12 ENCOUNTER FOR ANTINEOPLASTIC IMMUNOTHERAPY: ICD-10-CM

## 2025-01-07 DIAGNOSIS — D64.9 ANEMIA, UNSPECIFIED: ICD-10-CM

## 2025-01-07 DIAGNOSIS — C34.92 MALIGNANT NEOPLASM OF UNSPECIFIED PART OF LEFT BRONCHUS OR LUNG: ICD-10-CM

## 2025-01-07 PROCEDURE — 99215 OFFICE O/P EST HI 40 MIN: CPT

## 2025-01-07 PROCEDURE — G2211 COMPLEX E/M VISIT ADD ON: CPT

## 2025-01-07 PROCEDURE — 36415 COLL VENOUS BLD VENIPUNCTURE: CPT

## 2025-01-08 ENCOUNTER — RESULT REVIEW (OUTPATIENT)
Age: 89
End: 2025-01-08

## 2025-01-22 ENCOUNTER — APPOINTMENT (OUTPATIENT)
Dept: RADIATION ONCOLOGY | Facility: CLINIC | Age: 89
End: 2025-01-22

## 2025-01-30 ENCOUNTER — APPOINTMENT (OUTPATIENT)
Dept: HEMATOLOGY ONCOLOGY | Facility: CLINIC | Age: 89
End: 2025-01-30

## 2025-04-01 ENCOUNTER — APPOINTMENT (OUTPATIENT)
Dept: HEART AND VASCULAR | Facility: CLINIC | Age: 89
End: 2025-04-01